# Patient Record
Sex: FEMALE | Race: WHITE | NOT HISPANIC OR LATINO | Employment: OTHER | URBAN - NONMETROPOLITAN AREA
[De-identification: names, ages, dates, MRNs, and addresses within clinical notes are randomized per-mention and may not be internally consistent; named-entity substitution may affect disease eponyms.]

---

## 2024-08-02 ENCOUNTER — ANESTHESIA (OUTPATIENT)
Dept: PERIOP | Facility: HOSPITAL | Age: 79
End: 2024-08-02
Payer: MEDICARE

## 2024-08-02 ENCOUNTER — APPOINTMENT (OUTPATIENT)
Dept: CT IMAGING | Facility: HOSPITAL | Age: 79
DRG: 398 | End: 2024-08-02
Payer: MEDICARE

## 2024-08-02 ENCOUNTER — ANESTHESIA EVENT (OUTPATIENT)
Dept: PERIOP | Facility: HOSPITAL | Age: 79
End: 2024-08-02
Payer: MEDICARE

## 2024-08-02 ENCOUNTER — HOSPITAL ENCOUNTER (INPATIENT)
Facility: HOSPITAL | Age: 79
LOS: 5 days | Discharge: HOME | DRG: 398 | End: 2024-08-09
Attending: SURGERY | Admitting: SURGERY
Payer: MEDICARE

## 2024-08-02 DIAGNOSIS — Z90.49 STATUS POST LAPAROSCOPIC APPENDECTOMY: Primary | ICD-10-CM

## 2024-08-02 DIAGNOSIS — K35.30 ACUTE APPENDICITIS WITH LOCALIZED PERITONITIS, WITHOUT PERFORATION, ABSCESS, OR GANGRENE: ICD-10-CM

## 2024-08-02 DIAGNOSIS — Z74.09 IMPAIRED MOBILITY: ICD-10-CM

## 2024-08-02 DIAGNOSIS — R13.10 DYSPHAGIA, UNSPECIFIED TYPE: ICD-10-CM

## 2024-08-02 DIAGNOSIS — K35.80 APPENDICITIS, ACUTE: ICD-10-CM

## 2024-08-02 DIAGNOSIS — K35.80 ACUTE APPENDICITIS, UNSPECIFIED ACUTE APPENDICITIS TYPE: ICD-10-CM

## 2024-08-02 LAB
ALBUMIN SERPL-MCNC: 3.5 G/DL (ref 3.5–5.2)
ALBUMIN/GLOB SERPL: 1 G/DL
ALP SERPL-CCNC: 104 U/L (ref 39–117)
ALT SERPL W P-5'-P-CCNC: 15 U/L (ref 1–33)
ANION GAP SERPL CALCULATED.3IONS-SCNC: 14 MMOL/L (ref 5–15)
AST SERPL-CCNC: 19 U/L (ref 1–32)
BACTERIA UR QL AUTO: ABNORMAL /HPF
BASOPHILS # BLD AUTO: 0.04 10*3/MM3 (ref 0–0.2)
BASOPHILS NFR BLD AUTO: 0.2 % (ref 0–1.5)
BILIRUB SERPL-MCNC: 0.8 MG/DL (ref 0–1.2)
BILIRUB UR QL STRIP: NEGATIVE
BUN SERPL-MCNC: 16 MG/DL (ref 8–23)
BUN/CREAT SERPL: 16.5 (ref 7–25)
CALCIUM SPEC-SCNC: 9 MG/DL (ref 8.6–10.5)
CHLORIDE SERPL-SCNC: 96 MMOL/L (ref 98–107)
CLARITY UR: CLEAR
CO2 SERPL-SCNC: 26 MMOL/L (ref 22–29)
COLOR UR: ABNORMAL
CREAT SERPL-MCNC: 0.97 MG/DL (ref 0.57–1)
D-LACTATE SERPL-SCNC: 1.4 MMOL/L (ref 0.5–2)
DEPRECATED RDW RBC AUTO: 43.1 FL (ref 37–54)
EGFRCR SERPLBLD CKD-EPI 2021: 60.3 ML/MIN/1.73
EOSINOPHIL # BLD AUTO: 0.05 10*3/MM3 (ref 0–0.4)
EOSINOPHIL NFR BLD AUTO: 0.2 % (ref 0.3–6.2)
ERYTHROCYTE [DISTWIDTH] IN BLOOD BY AUTOMATED COUNT: 12.6 % (ref 12.3–15.4)
GLOBULIN UR ELPH-MCNC: 3.5 GM/DL
GLUCOSE SERPL-MCNC: 184 MG/DL (ref 65–99)
GLUCOSE UR STRIP-MCNC: NEGATIVE MG/DL
HCT VFR BLD AUTO: 37.1 % (ref 34–46.6)
HGB BLD-MCNC: 11.7 G/DL (ref 12–15.9)
HGB UR QL STRIP.AUTO: NEGATIVE
HYALINE CASTS UR QL AUTO: ABNORMAL /LPF
IMM GRANULOCYTES # BLD AUTO: 0.18 10*3/MM3 (ref 0–0.05)
IMM GRANULOCYTES NFR BLD AUTO: 0.8 % (ref 0–0.5)
KETONES UR QL STRIP: NEGATIVE
LEUKOCYTE ESTERASE UR QL STRIP.AUTO: ABNORMAL
LIPASE SERPL-CCNC: 13 U/L (ref 13–60)
LYMPHOCYTES # BLD AUTO: 1 10*3/MM3 (ref 0.7–3.1)
LYMPHOCYTES NFR BLD AUTO: 4.6 % (ref 19.6–45.3)
MCH RBC QN AUTO: 29 PG (ref 26.6–33)
MCHC RBC AUTO-ENTMCNC: 31.5 G/DL (ref 31.5–35.7)
MCV RBC AUTO: 92.1 FL (ref 79–97)
MONOCYTES # BLD AUTO: 1.58 10*3/MM3 (ref 0.1–0.9)
MONOCYTES NFR BLD AUTO: 7.3 % (ref 5–12)
NEUTROPHILS NFR BLD AUTO: 18.71 10*3/MM3 (ref 1.7–7)
NEUTROPHILS NFR BLD AUTO: 86.9 % (ref 42.7–76)
NITRITE UR QL STRIP: NEGATIVE
NRBC BLD AUTO-RTO: 0 /100 WBC (ref 0–0.2)
PH UR STRIP.AUTO: 5.5 [PH] (ref 5–8)
PLATELET # BLD AUTO: 187 10*3/MM3 (ref 140–450)
PMV BLD AUTO: 12.1 FL (ref 6–12)
POTASSIUM SERPL-SCNC: 3.4 MMOL/L (ref 3.5–5.2)
PROT SERPL-MCNC: 7 G/DL (ref 6–8.5)
PROT UR QL STRIP: ABNORMAL
RBC # BLD AUTO: 4.03 10*6/MM3 (ref 3.77–5.28)
RBC # UR STRIP: ABNORMAL /HPF
REF LAB TEST METHOD: ABNORMAL
SODIUM SERPL-SCNC: 136 MMOL/L (ref 136–145)
SP GR UR STRIP: >1.03 (ref 1–1.03)
SQUAMOUS #/AREA URNS HPF: ABNORMAL /HPF
UROBILINOGEN UR QL STRIP: ABNORMAL
WBC # UR STRIP: ABNORMAL /HPF
WBC NRBC COR # BLD AUTO: 21.56 10*3/MM3 (ref 3.4–10.8)

## 2024-08-02 PROCEDURE — 99285 EMERGENCY DEPT VISIT HI MDM: CPT

## 2024-08-02 PROCEDURE — 83690 ASSAY OF LIPASE: CPT | Performed by: NURSE PRACTITIONER

## 2024-08-02 PROCEDURE — 25810000003 SODIUM CHLORIDE 0.9 % SOLUTION: Performed by: NURSE PRACTITIONER

## 2024-08-02 PROCEDURE — 25010000002 HYDROMORPHONE PER 4 MG: Performed by: NURSE PRACTITIONER

## 2024-08-02 PROCEDURE — 25010000002 ONDANSETRON PER 1 MG: Performed by: NURSE ANESTHETIST, CERTIFIED REGISTERED

## 2024-08-02 PROCEDURE — 74177 CT ABD & PELVIS W/CONTRAST: CPT

## 2024-08-02 PROCEDURE — 25010000002 BUPIVACAINE (PF) 0.5 % SOLUTION 10 ML VIAL: Performed by: SURGERY

## 2024-08-02 PROCEDURE — 25010000002 DEXAMETHASONE PER 1 MG: Performed by: NURSE ANESTHETIST, CERTIFIED REGISTERED

## 2024-08-02 PROCEDURE — 0DTJ4ZZ RESECTION OF APPENDIX, PERCUTANEOUS ENDOSCOPIC APPROACH: ICD-10-PCS | Performed by: SURGERY

## 2024-08-02 PROCEDURE — 44970 LAPAROSCOPY APPENDECTOMY: CPT | Performed by: SURGERY

## 2024-08-02 PROCEDURE — 80053 COMPREHEN METABOLIC PANEL: CPT | Performed by: NURSE PRACTITIONER

## 2024-08-02 PROCEDURE — 25510000001 IOPAMIDOL 61 % SOLUTION: Performed by: NURSE PRACTITIONER

## 2024-08-02 PROCEDURE — 25810000003 LACTATED RINGERS PER 1000 ML: Performed by: SURGERY

## 2024-08-02 PROCEDURE — 88304 TISSUE EXAM BY PATHOLOGIST: CPT | Performed by: SURGERY

## 2024-08-02 PROCEDURE — 25010000002 ONDANSETRON PER 1 MG: Performed by: NURSE PRACTITIONER

## 2024-08-02 PROCEDURE — 25010000002 KETOROLAC TROMETHAMINE PER 15 MG: Performed by: NURSE ANESTHETIST, CERTIFIED REGISTERED

## 2024-08-02 PROCEDURE — 25010000002 PROPOFOL 10 MG/ML EMULSION: Performed by: NURSE ANESTHETIST, CERTIFIED REGISTERED

## 2024-08-02 PROCEDURE — 25010000002 MORPHINE SULFATE (PF) 2 MG/ML SOLUTION: Performed by: NURSE PRACTITIONER

## 2024-08-02 PROCEDURE — G0378 HOSPITAL OBSERVATION PER HR: HCPCS

## 2024-08-02 PROCEDURE — 83605 ASSAY OF LACTIC ACID: CPT | Performed by: NURSE PRACTITIONER

## 2024-08-02 PROCEDURE — 25010000002 HYDROMORPHONE 1 MG/ML SOLUTION: Performed by: NURSE ANESTHETIST, CERTIFIED REGISTERED

## 2024-08-02 PROCEDURE — 25010000002 VASOPRESSIN 20 UNIT/ML SOLUTION: Performed by: NURSE ANESTHETIST, CERTIFIED REGISTERED

## 2024-08-02 PROCEDURE — 25010000002 FENTANYL CITRATE (PF) 100 MCG/2ML SOLUTION: Performed by: NURSE ANESTHETIST, CERTIFIED REGISTERED

## 2024-08-02 PROCEDURE — 25010000002 THIAMINE HCL 200 MG/2ML SOLUTION: Performed by: SURGERY

## 2024-08-02 PROCEDURE — 25010000002 CEFAZOLIN PER 500 MG: Performed by: SURGERY

## 2024-08-02 PROCEDURE — 81001 URINALYSIS AUTO W/SCOPE: CPT | Performed by: NURSE PRACTITIONER

## 2024-08-02 PROCEDURE — 25010000002 LIDOCAINE 1 % SOLUTION 20 ML VIAL: Performed by: SURGERY

## 2024-08-02 PROCEDURE — 25010000002 CEFAZOLIN PER 500 MG: Performed by: NURSE ANESTHETIST, CERTIFIED REGISTERED

## 2024-08-02 PROCEDURE — 85025 COMPLETE CBC W/AUTO DIFF WBC: CPT | Performed by: NURSE PRACTITIONER

## 2024-08-02 PROCEDURE — 25010000002 GLYCOPYRROLATE 0.4 MG/2ML SOLUTION: Performed by: NURSE ANESTHETIST, CERTIFIED REGISTERED

## 2024-08-02 PROCEDURE — 25810000003 LACTATED RINGERS PER 1000 ML: Performed by: NURSE ANESTHETIST, CERTIFIED REGISTERED

## 2024-08-02 DEVICE — LIGAMAX 5 MM ENDOSCOPIC MULTIPLE CLIP APPLIER
Type: IMPLANTABLE DEVICE | Site: ABDOMEN | Status: FUNCTIONAL
Brand: LIGAMAX

## 2024-08-02 DEVICE — ENDOPATH ECHELON ENDOSCOPIC LINEAR CUTTER RELOADS, WHITE, 60MM
Type: IMPLANTABLE DEVICE | Site: ABDOMEN | Status: FUNCTIONAL
Brand: ECHELON ENDOPATH

## 2024-08-02 DEVICE — ENDOPATH ECHELON ENDOSCOPIC LINEAR CUTTER RELOADS, BLUE, 60MM
Type: IMPLANTABLE DEVICE | Site: ABDOMEN | Status: FUNCTIONAL
Brand: ECHELON ENDOPATH

## 2024-08-02 RX ORDER — HYDROCODONE BITARTRATE AND ACETAMINOPHEN 5; 325 MG/1; MG/1
1 TABLET ORAL EVERY 6 HOURS PRN
Status: DISCONTINUED | OUTPATIENT
Start: 2024-08-02 | End: 2024-08-09 | Stop reason: HOSPADM

## 2024-08-02 RX ORDER — SODIUM CHLORIDE 0.9 % (FLUSH) 0.9 %
1-10 SYRINGE (ML) INJECTION AS NEEDED
Status: DISCONTINUED | OUTPATIENT
Start: 2024-08-02 | End: 2024-08-09 | Stop reason: HOSPADM

## 2024-08-02 RX ORDER — HYDROCODONE BITARTRATE AND ACETAMINOPHEN 5; 325 MG/1; MG/1
1 TABLET ORAL EVERY 4 HOURS PRN
Status: DISCONTINUED | OUTPATIENT
Start: 2024-08-02 | End: 2024-08-02 | Stop reason: HOSPADM

## 2024-08-02 RX ORDER — DROPERIDOL 2.5 MG/ML
0.62 INJECTION, SOLUTION INTRAMUSCULAR; INTRAVENOUS ONCE AS NEEDED
Status: DISCONTINUED | OUTPATIENT
Start: 2024-08-02 | End: 2024-08-02 | Stop reason: HOSPADM

## 2024-08-02 RX ORDER — CEFAZOLIN SODIUM 1 G/3ML
INJECTION, POWDER, FOR SOLUTION INTRAMUSCULAR; INTRAVENOUS AS NEEDED
Status: DISCONTINUED | OUTPATIENT
Start: 2024-08-02 | End: 2024-08-02 | Stop reason: SURG

## 2024-08-02 RX ORDER — ONDANSETRON 2 MG/ML
4 INJECTION INTRAMUSCULAR; INTRAVENOUS ONCE AS NEEDED
Status: COMPLETED | OUTPATIENT
Start: 2024-08-02 | End: 2024-08-02

## 2024-08-02 RX ORDER — IBUPROFEN 600 MG/1
600 TABLET ORAL EVERY 6 HOURS PRN
Status: DISCONTINUED | OUTPATIENT
Start: 2024-08-02 | End: 2024-08-02 | Stop reason: HOSPADM

## 2024-08-02 RX ORDER — KETOROLAC TROMETHAMINE 30 MG/ML
30 INJECTION, SOLUTION INTRAMUSCULAR; INTRAVENOUS EVERY 8 HOURS PRN
Status: ACTIVE | OUTPATIENT
Start: 2024-08-02 | End: 2024-08-03

## 2024-08-02 RX ORDER — LABETALOL HYDROCHLORIDE 5 MG/ML
5 INJECTION, SOLUTION INTRAVENOUS
Status: DISCONTINUED | OUTPATIENT
Start: 2024-08-02 | End: 2024-08-02 | Stop reason: HOSPADM

## 2024-08-02 RX ORDER — NALOXONE HCL 0.4 MG/ML
0.4 VIAL (ML) INJECTION AS NEEDED
Status: DISCONTINUED | OUTPATIENT
Start: 2024-08-02 | End: 2024-08-02 | Stop reason: HOSPADM

## 2024-08-02 RX ORDER — PANTOPRAZOLE SODIUM 40 MG/1
40 TABLET, DELAYED RELEASE ORAL
Status: DISCONTINUED | OUTPATIENT
Start: 2024-08-03 | End: 2024-08-09 | Stop reason: HOSPADM

## 2024-08-02 RX ORDER — ONDANSETRON 4 MG/1
4 TABLET, ORALLY DISINTEGRATING ORAL EVERY 6 HOURS PRN
Status: DISCONTINUED | OUTPATIENT
Start: 2024-08-02 | End: 2024-08-03

## 2024-08-02 RX ORDER — GLYCOPYRROLATE 0.2 MG/ML
INJECTION INTRAMUSCULAR; INTRAVENOUS AS NEEDED
Status: DISCONTINUED | OUTPATIENT
Start: 2024-08-02 | End: 2024-08-02 | Stop reason: SURG

## 2024-08-02 RX ORDER — THIAMINE HYDROCHLORIDE 100 MG/ML
100 INJECTION, SOLUTION INTRAMUSCULAR; INTRAVENOUS ONCE
Status: COMPLETED | OUTPATIENT
Start: 2024-08-02 | End: 2024-08-02

## 2024-08-02 RX ORDER — TRAMADOL HYDROCHLORIDE 50 MG/1
50 TABLET ORAL EVERY 6 HOURS PRN
Status: DISCONTINUED | OUTPATIENT
Start: 2024-08-02 | End: 2024-08-09 | Stop reason: HOSPADM

## 2024-08-02 RX ORDER — NEOSTIGMINE METHYLSULFATE 5 MG/5 ML
SYRINGE (ML) INTRAVENOUS AS NEEDED
Status: DISCONTINUED | OUTPATIENT
Start: 2024-08-02 | End: 2024-08-02 | Stop reason: SURG

## 2024-08-02 RX ORDER — KETOROLAC TROMETHAMINE 30 MG/ML
INJECTION, SOLUTION INTRAMUSCULAR; INTRAVENOUS AS NEEDED
Status: DISCONTINUED | OUTPATIENT
Start: 2024-08-02 | End: 2024-08-02 | Stop reason: SURG

## 2024-08-02 RX ORDER — FENTANYL CITRATE 50 UG/ML
50 INJECTION, SOLUTION INTRAMUSCULAR; INTRAVENOUS
Status: DISCONTINUED | OUTPATIENT
Start: 2024-08-02 | End: 2024-08-02 | Stop reason: HOSPADM

## 2024-08-02 RX ORDER — SODIUM CHLORIDE 0.9 % (FLUSH) 0.9 %
10 SYRINGE (ML) INJECTION AS NEEDED
Status: DISCONTINUED | OUTPATIENT
Start: 2024-08-02 | End: 2024-08-02

## 2024-08-02 RX ORDER — ONDANSETRON 2 MG/ML
4 INJECTION INTRAMUSCULAR; INTRAVENOUS EVERY 6 HOURS PRN
Status: DISCONTINUED | OUTPATIENT
Start: 2024-08-02 | End: 2024-08-03 | Stop reason: SDUPTHER

## 2024-08-02 RX ORDER — METRONIDAZOLE 500 MG/100ML
500 INJECTION, SOLUTION INTRAVENOUS EVERY 8 HOURS
Status: COMPLETED | OUTPATIENT
Start: 2024-08-03 | End: 2024-08-04

## 2024-08-02 RX ORDER — SODIUM CHLORIDE 9 MG/ML
40 INJECTION, SOLUTION INTRAVENOUS AS NEEDED
Status: DISCONTINUED | OUTPATIENT
Start: 2024-08-02 | End: 2024-08-08

## 2024-08-02 RX ORDER — ONDANSETRON 2 MG/ML
INJECTION INTRAMUSCULAR; INTRAVENOUS AS NEEDED
Status: DISCONTINUED | OUTPATIENT
Start: 2024-08-02 | End: 2024-08-02 | Stop reason: SURG

## 2024-08-02 RX ORDER — ONDANSETRON 2 MG/ML
4 INJECTION INTRAMUSCULAR; INTRAVENOUS ONCE
Status: COMPLETED | OUTPATIENT
Start: 2024-08-02 | End: 2024-08-02

## 2024-08-02 RX ORDER — HYDROCODONE BITARTRATE AND ACETAMINOPHEN 10; 325 MG/1; MG/1
1 TABLET ORAL EVERY 4 HOURS PRN
Status: DISCONTINUED | OUTPATIENT
Start: 2024-08-02 | End: 2024-08-02 | Stop reason: HOSPADM

## 2024-08-02 RX ORDER — ROCURONIUM BROMIDE 10 MG/ML
INJECTION, SOLUTION INTRAVENOUS AS NEEDED
Status: DISCONTINUED | OUTPATIENT
Start: 2024-08-02 | End: 2024-08-02 | Stop reason: SURG

## 2024-08-02 RX ORDER — NALOXONE HCL 0.4 MG/ML
0.1 VIAL (ML) INJECTION
Status: DISCONTINUED | OUTPATIENT
Start: 2024-08-02 | End: 2024-08-09 | Stop reason: HOSPADM

## 2024-08-02 RX ORDER — DIPHENHYDRAMINE HYDROCHLORIDE 50 MG/ML
25 INJECTION INTRAMUSCULAR; INTRAVENOUS EVERY 6 HOURS PRN
Status: DISCONTINUED | OUTPATIENT
Start: 2024-08-02 | End: 2024-08-09 | Stop reason: HOSPADM

## 2024-08-02 RX ORDER — LIDOCAINE HYDROCHLORIDE 20 MG/ML
INJECTION, SOLUTION EPIDURAL; INFILTRATION; INTRACAUDAL; PERINEURAL AS NEEDED
Status: DISCONTINUED | OUTPATIENT
Start: 2024-08-02 | End: 2024-08-02 | Stop reason: SURG

## 2024-08-02 RX ORDER — HYDROMORPHONE HYDROCHLORIDE 1 MG/ML
0.5 INJECTION, SOLUTION INTRAMUSCULAR; INTRAVENOUS; SUBCUTANEOUS ONCE
Status: COMPLETED | OUTPATIENT
Start: 2024-08-02 | End: 2024-08-02

## 2024-08-02 RX ORDER — FENTANYL CITRATE 50 UG/ML
INJECTION, SOLUTION INTRAMUSCULAR; INTRAVENOUS AS NEEDED
Status: DISCONTINUED | OUTPATIENT
Start: 2024-08-02 | End: 2024-08-02 | Stop reason: SURG

## 2024-08-02 RX ORDER — SODIUM CHLORIDE 0.9 % (FLUSH) 0.9 %
10 SYRINGE (ML) INJECTION EVERY 12 HOURS SCHEDULED
Status: DISCONTINUED | OUTPATIENT
Start: 2024-08-03 | End: 2024-08-08

## 2024-08-02 RX ORDER — OMEPRAZOLE 40 MG/1
40 CAPSULE, DELAYED RELEASE ORAL DAILY
COMMUNITY

## 2024-08-02 RX ORDER — LOSARTAN POTASSIUM 25 MG/1
25 TABLET ORAL DAILY
Status: ON HOLD | COMMUNITY
End: 2024-08-03 | Stop reason: DRUGHIGH

## 2024-08-02 RX ORDER — SODIUM CHLORIDE, SODIUM LACTATE, POTASSIUM CHLORIDE, CALCIUM CHLORIDE 600; 310; 30; 20 MG/100ML; MG/100ML; MG/100ML; MG/100ML
INJECTION, SOLUTION INTRAVENOUS CONTINUOUS PRN
Status: DISCONTINUED | OUTPATIENT
Start: 2024-08-02 | End: 2024-08-02 | Stop reason: SURG

## 2024-08-02 RX ORDER — ACETAMINOPHEN 160 MG/5ML
325 SOLUTION ORAL EVERY 8 HOURS
Status: DISCONTINUED | OUTPATIENT
Start: 2024-08-03 | End: 2024-08-03

## 2024-08-02 RX ORDER — LOSARTAN POTASSIUM 50 MG/1
25 TABLET ORAL DAILY
Status: DISCONTINUED | OUTPATIENT
Start: 2024-08-03 | End: 2024-08-07

## 2024-08-02 RX ORDER — FLUMAZENIL 0.1 MG/ML
0.2 INJECTION INTRAVENOUS AS NEEDED
Status: DISCONTINUED | OUTPATIENT
Start: 2024-08-02 | End: 2024-08-02 | Stop reason: HOSPADM

## 2024-08-02 RX ORDER — LABETALOL HYDROCHLORIDE 5 MG/ML
10 INJECTION, SOLUTION INTRAVENOUS
Status: COMPLETED | OUTPATIENT
Start: 2024-08-02 | End: 2024-08-04

## 2024-08-02 RX ORDER — PROPOFOL 10 MG/ML
VIAL (ML) INTRAVENOUS AS NEEDED
Status: DISCONTINUED | OUTPATIENT
Start: 2024-08-02 | End: 2024-08-02 | Stop reason: SURG

## 2024-08-02 RX ORDER — MORPHINE SULFATE 2 MG/ML
2 INJECTION, SOLUTION INTRAMUSCULAR; INTRAVENOUS ONCE
Status: COMPLETED | OUTPATIENT
Start: 2024-08-02 | End: 2024-08-02

## 2024-08-02 RX ORDER — HYDROMORPHONE HYDROCHLORIDE 1 MG/ML
0.5 INJECTION, SOLUTION INTRAMUSCULAR; INTRAVENOUS; SUBCUTANEOUS
Status: DISCONTINUED | OUTPATIENT
Start: 2024-08-02 | End: 2024-08-03

## 2024-08-02 RX ORDER — DEXAMETHASONE SODIUM PHOSPHATE 4 MG/ML
INJECTION, SOLUTION INTRA-ARTICULAR; INTRALESIONAL; INTRAMUSCULAR; INTRAVENOUS; SOFT TISSUE AS NEEDED
Status: DISCONTINUED | OUTPATIENT
Start: 2024-08-02 | End: 2024-08-02 | Stop reason: SURG

## 2024-08-02 RX ORDER — SIMETHICONE 80 MG
40 TABLET,CHEWABLE ORAL 4 TIMES DAILY PRN
Status: DISCONTINUED | OUTPATIENT
Start: 2024-08-02 | End: 2024-08-09 | Stop reason: HOSPADM

## 2024-08-02 RX ORDER — SODIUM CHLORIDE, SODIUM LACTATE, POTASSIUM CHLORIDE, CALCIUM CHLORIDE 600; 310; 30; 20 MG/100ML; MG/100ML; MG/100ML; MG/100ML
75 INJECTION, SOLUTION INTRAVENOUS CONTINUOUS
Status: DISCONTINUED | OUTPATIENT
Start: 2024-08-03 | End: 2024-08-07

## 2024-08-02 RX ADMIN — ONDANSETRON 4 MG: 2 INJECTION INTRAMUSCULAR; INTRAVENOUS at 19:58

## 2024-08-02 RX ADMIN — KETOROLAC TROMETHAMINE 10 MG: 30 INJECTION, SOLUTION INTRAMUSCULAR; INTRAVENOUS at 21:58

## 2024-08-02 RX ADMIN — DEXAMETHASONE SODIUM PHOSPHATE 4 MG: 4 INJECTION, SOLUTION INTRAMUSCULAR; INTRAVENOUS at 21:58

## 2024-08-02 RX ADMIN — LIDOCAINE HYDROCHLORIDE 100 MG: 20 INJECTION, SOLUTION EPIDURAL; INFILTRATION; INTRACAUDAL; PERINEURAL at 20:20

## 2024-08-02 RX ADMIN — MORPHINE SULFATE 2 MG: 2 INJECTION, SOLUTION INTRAMUSCULAR; INTRAVENOUS at 16:26

## 2024-08-02 RX ADMIN — ONDANSETRON 4 MG: 2 INJECTION INTRAMUSCULAR; INTRAVENOUS at 23:04

## 2024-08-02 RX ADMIN — CEFAZOLIN 1000 MG: 1 INJECTION, POWDER, FOR SOLUTION INTRAMUSCULAR; INTRAVENOUS at 23:42

## 2024-08-02 RX ADMIN — FENTANYL CITRATE 50 MCG: 50 INJECTION, SOLUTION INTRAMUSCULAR; INTRAVENOUS at 20:57

## 2024-08-02 RX ADMIN — SODIUM CHLORIDE, POTASSIUM CHLORIDE, SODIUM LACTATE AND CALCIUM CHLORIDE: 600; 310; 30; 20 INJECTION, SOLUTION INTRAVENOUS at 20:15

## 2024-08-02 RX ADMIN — HYDROMORPHONE HYDROCHLORIDE 1 MG: 1 INJECTION, SOLUTION INTRAMUSCULAR; INTRAVENOUS; SUBCUTANEOUS at 21:33

## 2024-08-02 RX ADMIN — SODIUM CHLORIDE, POTASSIUM CHLORIDE, SODIUM LACTATE AND CALCIUM CHLORIDE 125 ML/HR: 600; 310; 30; 20 INJECTION, SOLUTION INTRAVENOUS at 23:42

## 2024-08-02 RX ADMIN — ONDANSETRON 4 MG: 2 INJECTION INTRAMUSCULAR; INTRAVENOUS at 21:58

## 2024-08-02 RX ADMIN — SODIUM CHLORIDE 500 ML: 9 INJECTION, SOLUTION INTRAVENOUS at 16:23

## 2024-08-02 RX ADMIN — Medication 10 ML: at 23:43

## 2024-08-02 RX ADMIN — GLYCOPYRROLATE 0.4 MG: 0.2 INJECTION INTRAMUSCULAR; INTRAVENOUS at 21:58

## 2024-08-02 RX ADMIN — THIAMINE HYDROCHLORIDE 100 MG: 100 INJECTION, SOLUTION INTRAMUSCULAR; INTRAVENOUS at 23:43

## 2024-08-02 RX ADMIN — Medication 3 MG: at 21:58

## 2024-08-02 RX ADMIN — ONDANSETRON 4 MG: 2 INJECTION INTRAMUSCULAR; INTRAVENOUS at 16:26

## 2024-08-02 RX ADMIN — ROCURONIUM BROMIDE 10 MG: 10 INJECTION, SOLUTION INTRAVENOUS at 21:15

## 2024-08-02 RX ADMIN — PROPOFOL 100 MG: 10 INJECTION, EMULSION INTRAVENOUS at 20:20

## 2024-08-02 RX ADMIN — CEFAZOLIN 2 G: 330 INJECTION, POWDER, FOR SOLUTION INTRAMUSCULAR; INTRAVENOUS at 20:33

## 2024-08-02 RX ADMIN — IOPAMIDOL 100 ML: 612 INJECTION, SOLUTION INTRAVENOUS at 18:20

## 2024-08-02 RX ADMIN — ROCURONIUM BROMIDE 35 MG: 10 INJECTION, SOLUTION INTRAVENOUS at 20:20

## 2024-08-02 RX ADMIN — FENTANYL CITRATE 50 MCG: 50 INJECTION, SOLUTION INTRAMUSCULAR; INTRAVENOUS at 20:20

## 2024-08-02 RX ADMIN — HYDROMORPHONE HYDROCHLORIDE 0.5 MG: 1 INJECTION, SOLUTION INTRAMUSCULAR; INTRAVENOUS; SUBCUTANEOUS at 19:58

## 2024-08-02 NOTE — ED PROVIDER NOTES
Subjective   History of Present Illness  Patient is a 78-year-old female who presents to the ER with chief complaints of abdominal pain.  She states she began experiencing right lower quadrant abdominal pain yesterday.  She reports nausea with vomiting however denies any diarrhea.  She denies any recorded fevers or dysuria.  She states pain is much worse with walking or particular movements and is described as sharp and stabbing with movement.  Due to symptoms described she came the ER for evaluation and treatment.  Past medical history significant for hypertension        Review of Systems   Constitutional: Negative.  Negative for fever.   HENT: Negative.  Negative for congestion.    Respiratory: Negative.  Negative for cough and shortness of breath.    Cardiovascular: Negative.  Negative for chest pain.   Gastrointestinal:  Positive for abdominal pain, nausea and vomiting. Negative for constipation and diarrhea.   Genitourinary: Negative.  Negative for dysuria.   Musculoskeletal: Negative.  Negative for back pain.   Skin: Negative.    All other systems reviewed and are negative.      Past Medical History:   Diagnosis Date    Hypertension        No Known Allergies    Past Surgical History:   Procedure Laterality Date    HYSTERECTOMY         History reviewed. No pertinent family history.    Social History     Socioeconomic History    Marital status:            Objective   Physical Exam  Vitals and nursing note reviewed.   Constitutional:       Appearance: She is well-developed. She is ill-appearing.   HENT:      Head: Normocephalic and atraumatic.      Nose: Nose normal.   Eyes:      Conjunctiva/sclera: Conjunctivae normal.      Pupils: Pupils are equal, round, and reactive to light.   Cardiovascular:      Rate and Rhythm: Normal rate and regular rhythm.      Heart sounds: Normal heart sounds.   Pulmonary:      Effort: Pulmonary effort is normal.      Breath sounds: Normal breath sounds.   Abdominal:       General: Bowel sounds are normal.      Palpations: Abdomen is soft.      Tenderness: There is abdominal tenderness in the right lower quadrant. There is guarding. There is no right CVA tenderness or rebound.   Musculoskeletal:         General: Normal range of motion.      Cervical back: Normal range of motion and neck supple.   Skin:     General: Skin is warm and dry.      Capillary Refill: Capillary refill takes less than 2 seconds.   Neurological:      Mental Status: She is alert and oriented to person, place, and time.   Psychiatric:         Behavior: Behavior normal.         Procedures           ED Course  ED Course as of 08/02/24 1936   Fri Aug 02, 2024   1903 Discussed with Dr. Cruz who will be coming to the ER to evaluate the patient.   Discussed other findings on CT scan with the patient and  including the liver lesions that would need further evaluation outpatient with their pcp. Patient and  both expressed understanding.  [TW]   1909 Dr. Cruz at bedside to evaluate the patient.  [TW]      ED Course User Index  [TW] Maria Teresa John APRN                                             Medical Decision Making  Patient is a 78-year-old female who presents to the ER with chief complaints of abdominal pain.  She states she began experiencing right lower quadrant abdominal pain yesterday.  She reports nausea with vomiting however denies any diarrhea.  She denies any recorded fevers or dysuria.  She states pain is much worse with walking or particular movements and is described as sharp and stabbing with movement.  Due to symptoms described she came the ER for evaluation and treatment.  Past medical history significant for hypertension  Differential diagnosis: Appendicitis, UTI, kidney stone, viral illness, and other    Labs Reviewed  COMPREHENSIVE METABOLIC PANEL - Abnormal; Notable for the following components:     Glucose                       184 (*)                Potassium                      3.4 (*)                Chloride                      96 (*)              All other components within normal limits         Narrative: GFR Normal >60                  Chronic Kidney Disease <60                  Kidney Failure <15                                    The GFR formula is only valid for adults with stable renal function between ages 18 and 70.  CBC WITH AUTO DIFFERENTIAL - Abnormal; Notable for the following components:     WBC                           21.56 (*)               Hemoglobin                    11.7 (*)               MPV                           12.1 (*)               Neutrophil %                  86.9 (*)               Lymphocyte %                  4.6 (*)                Eosinophil %                  0.2 (*)                Immature Grans %              0.8 (*)                Neutrophils, Absolute         18.71 (*)               Monocytes, Absolute           1.58 (*)               Immature Grans, Absolute      0.18 (*)            All other components within normal limits  LIPASE - Normal  LACTIC ACID, PLASMA - Normal  URINALYSIS W/ MICROSCOPIC IF INDICATED (NO CULTURE)  CBC AND DIFFERENTIAL     CT Abdomen Pelvis With Contrast   Final Result    1. Acute appendicitis with wall thickening and inflammatory changes in    the adjacent soft tissues. There is an appendicolith. There is some free    fluid in the right lower quadrant and pelvis related to the inflammatory    process. No perforation or abscess is visualized.    2. Fatty infiltration of the liver. There are scattered liver lesions    that are not fully evaluated on this examination. They are not simple    cyst. The differential includes hemangiomas or other lesions. Metastatic    disease cannot be excluded in a patient of this age. One of the lesions    does demonstrate a focal area of nodular enhancement along the periphery    of the lesion which can be suggestive of hemangioma. Some of the lesions    are very small. Liver mass MRI or  CT is recommended to further evaluate    these lesions unless there are prior outside studies.    3. Prior hysterectomy. Atheromatous disease of the aortoiliac vessels.    Degenerative changes of the spine.              The full report of this exam was immediately signed and available to the    emergency room. The patient is currently in the emergency room.         This report was signed and finalized on 8/2/2024 6:42 PM by Dr. Rich Austin MD.         Discussed with Dr. Cruz who will be coming to the ER to evaluate the patient.   Discussed other findings on CT scan with the patient and  including the liver lesions that would need further evaluation outpatient with their pcp. Patient and  both expressed understanding.     Plan is to take patient to the OR - See Dr. Cruz note for details      Problems Addressed:  Acute appendicitis, unspecified acute appendicitis type: acute illness or injury    Amount and/or Complexity of Data Reviewed  Labs: ordered. Decision-making details documented in ED Course.  Radiology: ordered. Decision-making details documented in ED Course.  Discussion of management or test interpretation with external provider(s): Discussed with Dr. Cruz    Risk  Prescription drug management.        Final diagnoses:   Acute appendicitis, unspecified acute appendicitis type       ED Disposition  ED Disposition       ED Disposition   Send to OR    Condition   --    Comment   --               No follow-up provider specified.       Medication List      No changes were made to your prescriptions during this visit.            Maria Teresa John, APRN  08/02/24 1936

## 2024-08-03 ENCOUNTER — APPOINTMENT (OUTPATIENT)
Dept: GENERAL RADIOLOGY | Facility: HOSPITAL | Age: 79
DRG: 398 | End: 2024-08-03
Payer: MEDICARE

## 2024-08-03 LAB
ALBUMIN SERPL-MCNC: 3 G/DL (ref 3.5–5.2)
ALBUMIN/GLOB SERPL: 0.9 G/DL
ALP SERPL-CCNC: 96 U/L (ref 39–117)
ALT SERPL W P-5'-P-CCNC: 11 U/L (ref 1–33)
ANION GAP SERPL CALCULATED.3IONS-SCNC: 10 MMOL/L (ref 5–15)
AST SERPL-CCNC: 16 U/L (ref 1–32)
BASOPHILS # BLD AUTO: 0.01 10*3/MM3 (ref 0–0.2)
BASOPHILS NFR BLD AUTO: 0.1 % (ref 0–1.5)
BILIRUB SERPL-MCNC: 0.6 MG/DL (ref 0–1.2)
BUN SERPL-MCNC: 19 MG/DL (ref 8–23)
BUN/CREAT SERPL: 17.6 (ref 7–25)
CALCIUM SPEC-SCNC: 8.3 MG/DL (ref 8.6–10.5)
CHLORIDE SERPL-SCNC: 101 MMOL/L (ref 98–107)
CO2 SERPL-SCNC: 28 MMOL/L (ref 22–29)
CREAT SERPL-MCNC: 1.08 MG/DL (ref 0.57–1)
DEPRECATED RDW RBC AUTO: 44.8 FL (ref 37–54)
EGFRCR SERPLBLD CKD-EPI 2021: 53 ML/MIN/1.73
EOSINOPHIL # BLD AUTO: 0 10*3/MM3 (ref 0–0.4)
EOSINOPHIL NFR BLD AUTO: 0 % (ref 0.3–6.2)
ERYTHROCYTE [DISTWIDTH] IN BLOOD BY AUTOMATED COUNT: 13.1 % (ref 12.3–15.4)
GLOBULIN UR ELPH-MCNC: 3.4 GM/DL
GLUCOSE SERPL-MCNC: 136 MG/DL (ref 65–99)
HCT VFR BLD AUTO: 32.6 % (ref 34–46.6)
HGB BLD-MCNC: 10 G/DL (ref 12–15.9)
IMM GRANULOCYTES # BLD AUTO: 0.06 10*3/MM3 (ref 0–0.05)
IMM GRANULOCYTES NFR BLD AUTO: 0.5 % (ref 0–0.5)
LYMPHOCYTES # BLD AUTO: 1.36 10*3/MM3 (ref 0.7–3.1)
LYMPHOCYTES NFR BLD AUTO: 10.3 % (ref 19.6–45.3)
MCH RBC QN AUTO: 29.2 PG (ref 26.6–33)
MCHC RBC AUTO-ENTMCNC: 30.7 G/DL (ref 31.5–35.7)
MCV RBC AUTO: 95.3 FL (ref 79–97)
MONOCYTES # BLD AUTO: 0.93 10*3/MM3 (ref 0.1–0.9)
MONOCYTES NFR BLD AUTO: 7 % (ref 5–12)
NEUTROPHILS NFR BLD AUTO: 10.85 10*3/MM3 (ref 1.7–7)
NEUTROPHILS NFR BLD AUTO: 82.1 % (ref 42.7–76)
NRBC BLD AUTO-RTO: 0 /100 WBC (ref 0–0.2)
PLATELET # BLD AUTO: 159 10*3/MM3 (ref 140–450)
PMV BLD AUTO: 12.6 FL (ref 6–12)
POTASSIUM SERPL-SCNC: 3.8 MMOL/L (ref 3.5–5.2)
PROT SERPL-MCNC: 6.4 G/DL (ref 6–8.5)
RBC # BLD AUTO: 3.42 10*6/MM3 (ref 3.77–5.28)
SODIUM SERPL-SCNC: 139 MMOL/L (ref 136–145)
WBC NRBC COR # BLD AUTO: 13.21 10*3/MM3 (ref 3.4–10.8)

## 2024-08-03 PROCEDURE — 25010000002 METOCLOPRAMIDE PER 10 MG: Performed by: SURGERY

## 2024-08-03 PROCEDURE — 80053 COMPREHEN METABOLIC PANEL: CPT | Performed by: SURGERY

## 2024-08-03 PROCEDURE — 25010000002 CEFAZOLIN PER 500 MG: Performed by: SURGERY

## 2024-08-03 PROCEDURE — 25010000002 METRONIDAZOLE 500 MG/100ML SOLUTION: Performed by: SURGERY

## 2024-08-03 PROCEDURE — 25010000002 PROMETHAZINE PER 50 MG: Performed by: SURGERY

## 2024-08-03 PROCEDURE — 72170 X-RAY EXAM OF PELVIS: CPT

## 2024-08-03 PROCEDURE — 85025 COMPLETE CBC W/AUTO DIFF WBC: CPT | Performed by: SURGERY

## 2024-08-03 PROCEDURE — 25010000002 DEXAMETHASONE PER 1 MG: Performed by: SURGERY

## 2024-08-03 PROCEDURE — 94799 UNLISTED PULMONARY SVC/PX: CPT

## 2024-08-03 PROCEDURE — 25810000003 SODIUM CHLORIDE 0.9 % SOLUTION: Performed by: SURGERY

## 2024-08-03 PROCEDURE — 25010000002 HYDROMORPHONE PER 4 MG: Performed by: SURGERY

## 2024-08-03 PROCEDURE — 25010000002 ONDANSETRON PER 1 MG: Performed by: SURGERY

## 2024-08-03 PROCEDURE — G0378 HOSPITAL OBSERVATION PER HR: HCPCS

## 2024-08-03 PROCEDURE — 25810000003 LACTATED RINGERS PER 1000 ML: Performed by: SURGERY

## 2024-08-03 RX ORDER — METOCLOPRAMIDE HYDROCHLORIDE 5 MG/ML
5 INJECTION INTRAMUSCULAR; INTRAVENOUS EVERY 8 HOURS
Status: DISCONTINUED | OUTPATIENT
Start: 2024-08-03 | End: 2024-08-09 | Stop reason: HOSPADM

## 2024-08-03 RX ORDER — ONDANSETRON 2 MG/ML
4 INJECTION INTRAMUSCULAR; INTRAVENOUS EVERY 6 HOURS PRN
Status: DISCONTINUED | OUTPATIENT
Start: 2024-08-03 | End: 2024-08-09 | Stop reason: HOSPADM

## 2024-08-03 RX ORDER — ACETAMINOPHEN 325 MG/1
650 TABLET ORAL EVERY 6 HOURS
Status: DISCONTINUED | OUTPATIENT
Start: 2024-08-03 | End: 2024-08-09 | Stop reason: HOSPADM

## 2024-08-03 RX ORDER — LOSARTAN POTASSIUM AND HYDROCHLOROTHIAZIDE 25; 100 MG/1; MG/1
1 TABLET ORAL DAILY
COMMUNITY
End: 2024-08-09 | Stop reason: HOSPADM

## 2024-08-03 RX ORDER — DEXAMETHASONE SODIUM PHOSPHATE 4 MG/ML
4 INJECTION, SOLUTION INTRA-ARTICULAR; INTRALESIONAL; INTRAMUSCULAR; INTRAVENOUS; SOFT TISSUE EVERY 8 HOURS PRN
Status: DISCONTINUED | OUTPATIENT
Start: 2024-08-03 | End: 2024-08-08

## 2024-08-03 RX ADMIN — SODIUM CHLORIDE, POTASSIUM CHLORIDE, SODIUM LACTATE AND CALCIUM CHLORIDE 125 ML/HR: 600; 310; 30; 20 INJECTION, SOLUTION INTRAVENOUS at 16:55

## 2024-08-03 RX ADMIN — METRONIDAZOLE 500 MG: 500 INJECTION, SOLUTION INTRAVENOUS at 00:48

## 2024-08-03 RX ADMIN — METRONIDAZOLE 500 MG: 500 INJECTION, SOLUTION INTRAVENOUS at 16:56

## 2024-08-03 RX ADMIN — DEXAMETHASONE SODIUM PHOSPHATE 4 MG: 4 INJECTION, SOLUTION INTRA-ARTICULAR; INTRALESIONAL; INTRAMUSCULAR; INTRAVENOUS; SOFT TISSUE at 21:38

## 2024-08-03 RX ADMIN — SODIUM CHLORIDE, POTASSIUM CHLORIDE, SODIUM LACTATE AND CALCIUM CHLORIDE 125 ML/HR: 600; 310; 30; 20 INJECTION, SOLUTION INTRAVENOUS at 07:38

## 2024-08-03 RX ADMIN — METOCLOPRAMIDE HYDROCHLORIDE 5 MG: 5 INJECTION INTRAMUSCULAR; INTRAVENOUS at 21:11

## 2024-08-03 RX ADMIN — PROMETHAZINE HYDROCHLORIDE 12.5 MG: 25 INJECTION INTRAMUSCULAR; INTRAVENOUS at 23:02

## 2024-08-03 RX ADMIN — HYDROMORPHONE HYDROCHLORIDE 0.5 MG: 1 INJECTION, SOLUTION INTRAMUSCULAR; INTRAVENOUS; SUBCUTANEOUS at 05:49

## 2024-08-03 RX ADMIN — PANTOPRAZOLE SODIUM 40 MG: 40 TABLET, DELAYED RELEASE ORAL at 05:41

## 2024-08-03 RX ADMIN — ONDANSETRON 4 MG: 2 INJECTION INTRAMUSCULAR; INTRAVENOUS at 13:14

## 2024-08-03 RX ADMIN — ACETAMINOPHEN 650 MG: 325 TABLET ORAL at 13:14

## 2024-08-03 RX ADMIN — METOCLOPRAMIDE HYDROCHLORIDE 5 MG: 5 INJECTION INTRAMUSCULAR; INTRAVENOUS at 13:14

## 2024-08-03 RX ADMIN — METRONIDAZOLE 500 MG: 500 INJECTION, SOLUTION INTRAVENOUS at 09:52

## 2024-08-03 RX ADMIN — CEFAZOLIN 1000 MG: 1 INJECTION, POWDER, FOR SOLUTION INTRAMUSCULAR; INTRAVENOUS at 09:52

## 2024-08-03 RX ADMIN — SODIUM CHLORIDE 500 ML: 9 INJECTION, SOLUTION INTRAVENOUS at 13:14

## 2024-08-03 RX ADMIN — ONDANSETRON 4 MG: 2 INJECTION INTRAMUSCULAR; INTRAVENOUS at 01:49

## 2024-08-03 RX ADMIN — METOCLOPRAMIDE HYDROCHLORIDE 5 MG: 5 INJECTION INTRAMUSCULAR; INTRAVENOUS at 04:12

## 2024-08-03 RX ADMIN — Medication 10 ML: at 21:11

## 2024-08-03 NOTE — H&P
General Surgery   History and Physical     Referring Provider: No ref. provider found    Patient Care Team:  Jena Shen MD as PCP - General (Family Medicine)    Chief complaint: Right lower quadrant pain    Subjective      History of present illness:  The patient is a 78 y.o. female with complaints of right lower quadrant pain associated with nausea and vomiting.   Rachel Suiter yesterday the symptoms presented and did not resolve.  She stated she tried today and suffered nausea and vomiting symptoms.  Due to the symptoms not improving and progressing she visited our emergency department.    Review of Systems    Review of Systems - General ROS: negative  ENT ROS: negative  Respiratory ROS: no cough, shortness of breath, or wheezing  Cardiovascular ROS: no chest pain or dyspnea on exertion  Gastrointestinal ROS: positive for - abdominal pain and nausea/vomiting    History  Past Medical History:   Diagnosis Date    Hypertension    ,   Past Surgical History:   Procedure Laterality Date    HYSTERECTOMY     , History reviewed. No pertinent family history.,  , (Not in a hospital admission)   and Allergies:  Patient has no known allergies.    Current Facility-Administered Medications:     HYDROmorphone (DILAUDID) injection 0.5 mg, 0.5 mg, Intravenous, Once, Maria Teresa John, CAMPBELL    ondansetron (ZOFRAN) injection 4 mg, 4 mg, Intravenous, Once, Maria Teresa John APRN    [COMPLETED] Insert Peripheral IV, , , Once **AND** sodium chloride 0.9 % flush 10 mL, 10 mL, Intravenous, PRN, Maria Teresa John, CAMPBELL    Current Outpatient Medications:     losartan (COZAAR) 25 MG tablet, Take 1 tablet by mouth Daily., Disp: , Rfl:     omeprazole (priLOSEC) 40 MG capsule, Take 1 capsule by mouth Daily., Disp: , Rfl:     Objective     Vital Signs   Temp:  [98.6 °F (37 °C)] 98.6 °F (37 °C)  Heart Rate:  [81-96] 81  Resp:  [16-18] 18  BP: (124-133)/(68-73) 133/68    Physical Exam:  Physical Exam  Constitutional:       General:  She is not in acute distress.     Appearance: She is normal weight. She is not ill-appearing.   HENT:      Head: Normocephalic.   Cardiovascular:      Rate and Rhythm: Normal rate and regular rhythm.   Pulmonary:      Effort: Pulmonary effort is normal. No respiratory distress.      Breath sounds: Normal breath sounds.   Abdominal:      General: Abdomen is flat. Bowel sounds are normal. There is no distension.      Palpations: Abdomen is soft.      Tenderness: There is abdominal tenderness in the right lower quadrant. There is no guarding or rebound.          Results Review:     Lab Results (last 24 hours)       Procedure Component Value Units Date/Time    Urinalysis With Microscopic If Indicated (No Culture) - Urine, Clean Catch [[id number]]  (Abnormal) Collected: 08/02/24 1852    Specimen: Urine, Clean Catch Updated: 08/02/24 1928     Color, UA Dark Yellow     Appearance, UA Clear     pH, UA 5.5     Specific Gravity, UA >1.030     Glucose, UA Negative     Ketones, UA Negative     Bilirubin, UA Negative     Blood, UA Negative     Protein, UA 30 mg/dL (1+)     Leuk Esterase, UA Trace     Nitrite, UA Negative     Urobilinogen, UA 0.2 E.U./dL    Urinalysis, Microscopic Only - Urine, Clean Catch [[id number]]  (Abnormal) Collected: 08/02/24 1852    Specimen: Urine, Clean Catch Updated: 08/02/24 1928     RBC, UA 0-2 /HPF      WBC, UA 3-5 /HPF      Bacteria, UA 1+ /HPF      Squamous Epithelial Cells, UA 7-12 /HPF      Hyaline Casts, UA None Seen /LPF      Methodology Manual Light Microscopy    Comprehensive Metabolic Panel [[id number]]  (Abnormal) Collected: 08/02/24 1625    Specimen: Blood Updated: 08/02/24 1654     Glucose 184 mg/dL      BUN 16 mg/dL      Creatinine 0.97 mg/dL      Sodium 136 mmol/L      Potassium 3.4 mmol/L      Chloride 96 mmol/L      CO2 26.0 mmol/L      Calcium 9.0 mg/dL      Total Protein 7.0 g/dL      Albumin 3.5 g/dL      ALT (SGPT) 15 U/L      AST (SGOT) 19 U/L      Alkaline Phosphatase 104 U/L       Total Bilirubin 0.8 mg/dL      Globulin 3.5 gm/dL      A/G Ratio 1.0 g/dL      BUN/Creatinine Ratio 16.5     Anion Gap 14.0 mmol/L      eGFR 60.3 mL/min/1.73     Narrative:      GFR Normal >60  Chronic Kidney Disease <60  Kidney Failure <15    The GFR formula is only valid for adults with stable renal function between ages 18 and 70.    Lactic Acid, Plasma [[id number]]  (Normal) Collected: 08/02/24 1625    Specimen: Blood Updated: 08/02/24 1652     Lactate 1.4 mmol/L     Lipase [[id number]]  (Normal) Collected: 08/02/24 1625    Specimen: Blood Updated: 08/02/24 1649     Lipase 13 U/L     CBC & Differential [[id number]]  (Abnormal) Collected: 08/02/24 1625    Specimen: Blood Updated: 08/02/24 1636    Narrative:      The following orders were created for panel order CBC & Differential.  Procedure                               Abnormality         Status                     ---------                               -----------         ------                     CBC Auto Differential[[id number]]        Abnormal            Final result                 Please view results for these tests on the individual orders.    CBC Auto Differential [[id number]]  (Abnormal) Collected: 08/02/24 1625    Specimen: Blood Updated: 08/02/24 1636     WBC 21.56 10*3/mm3      RBC 4.03 10*6/mm3      Hemoglobin 11.7 g/dL      Hematocrit 37.1 %      MCV 92.1 fL      MCH 29.0 pg      MCHC 31.5 g/dL      RDW 12.6 %      RDW-SD 43.1 fl      MPV 12.1 fL      Platelets 187 10*3/mm3      Neutrophil % 86.9 %      Lymphocyte % 4.6 %      Monocyte % 7.3 %      Eosinophil % 0.2 %      Basophil % 0.2 %      Immature Grans % 0.8 %      Neutrophils, Absolute 18.71 10*3/mm3      Lymphocytes, Absolute 1.00 10*3/mm3      Monocytes, Absolute 1.58 10*3/mm3      Eosinophils, Absolute 0.05 10*3/mm3      Basophils, Absolute 0.04 10*3/mm3      Immature Grans, Absolute 0.18 10*3/mm3      nRBC 0.0 /100 WBC           Imaging Results (Last 24 Hours)       Procedure Component  Value Units Date/Time    CT Abdomen Pelvis With Contrast [[id number]] Collected: 08/02/24 1834     Updated: 08/02/24 1845    Narrative:      EXAMINATION:  CT ABDOMEN PELVIS W CONTRAST-  8/2/2024 5:14 PM     HISTORY: Right lower quadrant abdominal pain.     TECHNIQUE: Spiral CT was performed of the abdomen and pelvis with IV  contrast. Multiplanar images were reconstructed.     DLP: 390.58 mGy.cm Automated dosage reduction technique was utilized to  reduce patient dose.     COMPARISON: No comparison study.      LUNG BASES: There is atelectasis in both lung bases. There is  cardiomegaly.     LIVER AND SPLEEN: There is fatty infiltration of the liver. There is a  1.6 cm low-density liver lesion in the right lobe image 25 series 2.  There are two 5 mm low-density lesions in the right hepatic lobe  laterally image 23 series 2. There is an additional 1.5 cm lesion in the  right hepatic lobe medially on the same image with suggestion of some  nodular enhancement along the wall. There is an additional 4 mm  low-density lesion in the right lobe laterally image 26 series 2. The  spleen is unremarkable. There are no dense gallstones.     PANCREAS: No pancreatic mass or inflammatory change.     KIDNEYS AND ADRENALS: The adrenal glands and left kidney are  unremarkable. There are 8 mm and 6 mm indeterminate small renal lesions  in the lateral right kidney on image 41 series 2. The ureters are  nondilated. There is thickening of the wall of the bladder. The bladder  is not well distended.     BOWEL: No oral contrast was given. Gastric wall thickening likely due to  under distention. Small hiatal hernia. Small bowel loops are nondilated.  There is thickening of the wall of the appendix. The appendix is dilated  measuring 12 mm. There is an appendicolith in the appendix. There is  extensive stranding of the fat adjacent to the appendix. There is a  small amount of free fluid in this area and in the pelvis. No abscess is  visualized.  There is mild wall thickening of the adjacent colon wall  likely secondarily inflamed. There is under distention of portions of  the colon.     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 Prior  hysterectomy. There are degenerative changes of the spine. No acute bony  abnormality is seen.          Impression:      1. Acute appendicitis with wall thickening and inflammatory changes in  the adjacent soft tissues. There is an appendicolith. There is some free  fluid in the right lower quadrant and pelvis related to the inflammatory  process. No perforation or abscess is visualized.  2. Fatty infiltration of the liver. There are scattered liver lesions  that are not fully evaluated on this examination. They are not simple  cyst. The differential includes hemangiomas or other lesions. Metastatic  disease cannot be excluded in a patient of this age. One of the lesions  does demonstrate a focal area of nodular enhancement along the periphery  of the lesion which can be suggestive of hemangioma. Some of the lesions  are very small. Liver mass MRI or CT is recommended to further evaluate  these lesions unless there are prior outside studies.  3. Prior hysterectomy. Atheromatous disease of the aortoiliac vessels.  Degenerative changes of the spine.        The full report of this exam was immediately signed and available to the  emergency room. The patient is currently in the emergency room.     This report was signed and finalized on 8/2/2024 6:42 PM by Dr. Rich Austin MD.               ASSESSMENT/PLAN   Diagnosis Plan   1. Acute appendicitis with localized peritonitis, without perforation, abscess, or gangrene  Case Request    Case Request      2. Acute appendicitis, unspecified acute appendicitis type             Acute appendicitis with localized peritonitis, without perforation, abscess, or gangrene    Acute appendicitis  Plan: Laparoscopic possible open appendectomy  I have discussed the  A,.Alternatives B, benefits C, complications and risks such as the complications and risks which include the risk of perforation, leakage,bleeding, intra-abdominal organ injury, stenosis or ulcerations, the risk of deep vein thrombosis formation leading to possible pulmonary embolisms, and the risk of death.  I explained to the patient the possibility that this procedure may not be performed laparoscopically and may require being converted to an opened procedure or aborted due to abnormal anatomy. associated with discomfort possible open appendectomy procedure with the patient.  I have addressed the patient's questions and concerns to her satisfaction to obtain their informed consent.      Alexey Cruz MD  08/02/24  19:40 CDT

## 2024-08-03 NOTE — OP NOTE
Laparoscopic Appendectomy Possible Open     Preoperative diagnosis: Acute appendicitis    Postoperative diagnosis: as above      Indications: The patient was admitted to the hospital with presentation of an acute appendicitis.         Surgeon: Alexey Cruz MD     Assistants: Tech    Anesthesia: General endotracheal anesthesia    ASA Class: 3          Procedure Details       After the patient was explained the alternatives, benefits, complications, and risks of a laparoscopic appendectomy possible open including the risk of intra-abdominal organ injury such as small bowel and colon, postoperative infection as well as postoperative wound issues such as herniations was explained to the patient informed consent was provided and signed.  The patient was brought to the OR suite after receiving preoperative antibiotics, medications and DVT prophylaxis with SCDs were placed.  The patient was placed under general anesthesia.  Lawrence catheter was placed under a sterile fashion.  The patient was then prepped and draped in standard fashion.  We performed a surgical timeout.  I proceeded with a Veress needle approach in the left upper/subcostal location.  The Veress needle was used to insufflate the abdominal cavity to a pressure of 15 mmHg.  This was followed by placement of a 5 mm incision which was followed by placement of a 5 mm trocar into the abdominal cavity with camera assist in the periumbilical location.      The Veress needle was identified and removed safely.  A 5 mm incision was made in the left upper quadrant location for placement of a 5 mm trocar under direct visualization.  Entering the abdominal cavity there was significant defect into diffusions in the midline from her previous hysterectomy.  I placed a 12 mm incision for placement of a 5 mm trocar into this location to help facilitate taking down these adhesions utilizing harmonic scalpel.  Once these midline adhesions were taken down I was able to  visualize the cecum.  I placed a 5 mm trocar in the suprapubic location after making a 5 mm incision and then I exchanged the left lower quadrant trocar with the 12 mm trocar under direct visualization. The patient was placed in Trendelenburg position and rotated to the patient's left side.  Identified the ascending colon and followed up to the cecum.  Patient's cecum was redundant and the appendix was tethered to the abdominal wall.  Blunt dissection was performed to free the adhesions and suction was utilized to suction away any questionable purulent fluid.  The appendix was torsion and I bluntly dissected to expose the mesoappendix and base of the appendix.  I created a window through the area of the cecum and base of the appendix and fired a blue load stapler across to free the appendix from its cecal attachments.  The appendix base was not as grossly inflamed as the appendix tip.  I noticed mild oozing at the staple line and I placed a 5 mm clip across the area to control hemostasis..  The mesoappendix was inflamed and mildly edematous and I elevated the appendix to expose the mesoappendix and placed a white load across the mesoappendix appendix and fired to completely remove the appendix from its attachment.  Prior to firing I sure that there was no small bowel involved within the jaws of the stapling device.    The appendix was then removed safely from its attachments and placed in an Endo cinch bag.  It was removed through the large trocar site under direct visualization.  I then assessed the staple line to observe for hemostasis.  Once hemostasis was confirmed I then proceeded with suction of the area to remove any fluid grossly.  The base of the mesoappendix and stump of the appendix/cecal location was hemostatic.  The larger trocar site's fascia was reapproximated using a Randy-Evonne device and an 0 Vicryl suture.     Then re-locally anesthetized skin wounds for closure.  Skin wounds were closed with  4-0 Monocryl.  Prior to closing skin wounds all lap pads and attachments were accounted for at the end of the procedure.      White staples: 1  Blue staples: 1      Findings: Acute appendicitis    Estimated Blood Loss:  minimal           Specimens: Appendix             Implants:   Implant Name Type Inv. Item Serial No.  Lot No. LRB No. Used Action   CLIPAPPLR M/ ENDO [id number] 5MM 33CM MD/LG - [id number] Implant CLIPAPPLR M/ ENDO [id number] 5MM 33CM MD/LG  [facility]  DIV [employer] [id number] N/A 1 Implanted              Complications: None           Disposition: PACU - hemodynamically stable.           Condition: stable

## 2024-08-03 NOTE — PLAN OF CARE
Problem: Adult Inpatient Plan of Care  Goal: Plan of Care Review  Outcome: Ongoing, Progressing  Flowsheets (Taken 8/3/2024 0428)  Progress: no change  Plan of Care Reviewed With: patient  Outcome Evaluation: Patient arrived to the floor from the PACU, no complains of pain this far. Complains of nausea x2 this far. Lap x3 with tegaderm and gauze. IVF and IV abx infusing per order. VSS. Safety maintained.

## 2024-08-03 NOTE — ANESTHESIA PROCEDURE NOTES
Airway  Date/Time: 8/2/2024 8:22 PM  Airway not difficult    General Information and Staff    Patient location during procedure: OR  CRNA/CAA: Adithya Cortez, CRNA    Indications and Patient Condition  Indications for airway management: airway protection    Preoxygenated: yes  Mask difficulty assessment: 0 - not attempted    Final Airway Details  Final airway type: endotracheal airway      Successful airway: ETT  Cuffed: yes   Successful intubation technique: direct laryngoscopy  Endotracheal tube insertion site: oral  Blade: Mel  Blade size: 3  ETT size (mm): 7.0  Cormack-Lehane Classification: grade I - full view of glottis  Placement verified by: chest auscultation and capnometry   Cuff volume (mL): 6  Measured from: lips  ETT/EBT  to lips (cm): 21  Number of attempts at approach: 1    Additional Comments  ATRAUMATIC INTUBATION

## 2024-08-03 NOTE — PLAN OF CARE
Goal Outcome Evaluation:  Plan of Care Reviewed With: patient, spouse        Progress: improving     VSS.  RA.  Medicated for pain X2 and nausea X1 this shift.  Lap sites with gauze/tegaderm - CDI.  IVF as ordered.  500cc bolus.  Voiding small amounts.  Spouse at bedside.  C/O right pelvic pain - MD aware - Xray ordered.  SCDs.  Safety maintained.

## 2024-08-03 NOTE — PROGRESS NOTES
"Patient Care Team:  Jena Shen MD as PCP - General (Family Medicine)    Alton Ramos Suiter is POD 1 day ago from a laparoscopic appendectomy.  Patient's complaints of nausea and vomited x 2 1 last night and 1 this a.m.  She also complains of right hip pain which she noted after receiving her CT scan.  She states it is difficult for her to ambulate since.  Denies flatus or bowel movement.  Abdominal pain has improved.       Review of Systems:     Review of Systems - General ROS: negative  Respiratory ROS: no cough, shortness of breath, or wheezing  Cardiovascular ROS: no chest pain or dyspnea on exertion  Gastrointestinal ROS: positive for - abdominal pain and nausea/vomiting  negative for - blood in stools, diarrhea, or swallowing difficulty/pain  Musculoskeletal ROS: positive for - joint pain    Objective     Vital Signs  /56 (BP Location: Right arm, Patient Position: Lying)   Pulse 75   Temp 98.3 °F (36.8 °C) (Oral)   Resp 16   Ht 172.7 cm (68\")   Wt 68.9 kg (151 lb 14.4 oz)   SpO2 90%   BMI 23.10 kg/m²     Physical Exam:  Physical Exam  Constitutional:       Appearance: Normal appearance. She is normal weight.   Cardiovascular:      Rate and Rhythm: Normal rate and regular rhythm.   Pulmonary:      Effort: Pulmonary effort is normal.      Breath sounds: Normal breath sounds.   Abdominal:      General: Bowel sounds are decreased. There is no distension.      Palpations: Abdomen is soft. There is no shifting dullness.      Tenderness: There is abdominal tenderness. There is no guarding or rebound.          Comments: Wound dressing sites are clean dry and intact.  Mild tenderness in the left lower quadrant.   Neurological:      Mental Status: She is alert.           Results Review:      CBC          8/2/2024    16:25 8/3/2024    05:29   CBC   WBC 21.56  13.21    RBC 4.03  3.42    Hemoglobin 11.7  10.0    Hematocrit 37.1  32.6    MCV 92.1  95.3    MCH 29.0  29.2    MCHC 31.5  30.7    RDW " 12.6  13.1    Platelets 187  159       CMP          8/2/2024    16:25 8/3/2024    05:29   CMP   Glucose 184  136    BUN 16  19    Creatinine 0.97  1.08    EGFR 60.3  53.0    Sodium 136  139    Potassium 3.4  3.8    Chloride 96  101    Calcium 9.0  8.3    Total Protein 7.0  6.4    Albumin 3.5  3.0    Globulin 3.5  3.4    Total Bilirubin 0.8  0.6    Alkaline Phosphatase 104  96    AST (SGOT) 19  16    ALT (SGPT) 15  11    Albumin/Globulin Ratio 1.0  0.9    BUN/Creatinine Ratio 16.5  17.6    Anion Gap 14.0  10.0             Medication Reviewed:   Current Facility-Administered Medications   Medication Dose Route Frequency Provider Last Rate Last Admin    acetaminophen (TYLENOL) tablet 650 mg  650 mg Oral Q6H Alexey Cruz MD        dexAMETHasone (DECADRON) injection 4 mg  4 mg Intravenous Q8H PRN Alexey Cruz MD        diphenhydrAMINE (BENADRYL) injection 25 mg  25 mg Intravenous Q6H PRN Alexey Cruz MD        HYDROcodone-acetaminophen (NORCO) 5-325 MG per tablet 1 tablet  1 tablet Oral Q6H PRN Alexey Cruz MD        ketorolac (TORADOL) injection 30 mg  30 mg Intravenous Q8H PRN Alexey Cruz MD        labetalol (NORMODYNE,TRANDATE) injection 10 mg  10 mg Intravenous Q30 Min PRN Alexey Cruz MD        lactated ringers infusion  125 mL/hr Intravenous Continuous Alexey Cruz  mL/hr at 08/03/24 1207 125 mL/hr at 08/03/24 1207    losartan (COZAAR) tablet 25 mg  25 mg Oral Daily Alexey Cruz MD        metoclopramide (REGLAN) injection 5 mg  5 mg Intravenous Q8H Alexey Cruz MD   5 mg at 08/03/24 0412    metroNIDAZOLE (FLAGYL) IVPB 500 mg  500 mg Intravenous Q8H Alexey Cruz MD   Stopped at 08/03/24 1100    naloxone (NARCAN) injection 0.1 mg  0.1 mg Intravenous Q5 Min PRN Alexey Cruz MD        ondansetron (ZOFRAN) injection 4 mg  4 mg Intravenous Q6H PRN Alexey Cruz MD        pantoprazole (PROTONIX) EC tablet 40 mg  40 mg Oral Q AM Alexey Cruz MD   40 mg  at 08/03/24 0541    promethazine (PHENERGAN) 12.5 mg in sodium chloride 0.9 % 50 mL  12.5 mg Intravenous Q6H PRN Alexey Cruz MD        simethicone (MYLICON) chewable tablet 40 mg  40 mg Oral 4x Daily PRN Alexey Cruz MD        sodium chloride 0.9 % bolus 500 mL  500 mL Intravenous Once Alexey Cruz MD        sodium chloride 0.9 % flush 1-10 mL  1-10 mL Intravenous PRN Alexey Cruz MD        sodium chloride 0.9 % flush 10 mL  10 mL Intravenous Q12H Alexey Cruz MD   10 mL at 08/02/24 2343    sodium chloride 0.9 % infusion 40 mL  40 mL Intravenous PRN Alexey Cruz MD        traMADol (ULTRAM) tablet 50 mg  50 mg Oral Q6H PRN Alexey Cruz MD           Assessment & Plan    POD 1 day ago from the above procedure with postoperative ileus.  Encourage incentive spirometer use, ambulate and will await GI function to progress.  Laboratory work is improved with respect to her leukocytosis.  Will complete antibiotic regimen and expected to end today.  Right hip pain I have ordered an x-ray of the pelvis and physical therapy consultation.  Explained plan and expectations with the patient and her .  Addressed their questions or concerns to their satisfaction.      Alexey Cruz MD  08/03/24  12:40 CDT

## 2024-08-03 NOTE — ED NOTES
Patient placed in a gown, all clothing in a patient belongings bag.  Patients dentures removed, and placed in denture cup with patient sticker.  Patients 2 rings placed in a denture cup with patient sticker.  Rings on right hand could not be removed, rings taped over at this time.  All patient belongings given to patients  at bedside.

## 2024-08-03 NOTE — ANESTHESIA PREPROCEDURE EVALUATION
Anesthesia Evaluation     NPO Solid Status: Waived due to emergency  NPO Liquid Status: Waived due to emergency           Airway   Mallampati: I  TM distance: >3 FB  Neck ROM: full  No difficulty expected  Dental    (+) edentulous    Pulmonary - negative pulmonary ROS and normal exam   Cardiovascular - normal exam  Exercise tolerance: good (4-7 METS)    (+) hypertension      Neuro/Psych  GI/Hepatic/Renal/Endo    (+) GERD    Musculoskeletal     Abdominal  - normal exam   Substance History      OB/GYN          Other                    Anesthesia Plan    ASA 2 - emergent     general     intravenous induction     Anesthetic plan, risks, benefits, and alternatives have been provided, discussed and informed consent has been obtained with: patient.    CODE STATUS:

## 2024-08-04 ENCOUNTER — APPOINTMENT (OUTPATIENT)
Dept: GENERAL RADIOLOGY | Facility: HOSPITAL | Age: 79
DRG: 398 | End: 2024-08-04
Payer: MEDICARE

## 2024-08-04 LAB
ALBUMIN SERPL-MCNC: 3 G/DL (ref 3.5–5.2)
ALBUMIN/GLOB SERPL: 0.8 G/DL
ALP SERPL-CCNC: 90 U/L (ref 39–117)
ALT SERPL W P-5'-P-CCNC: 8 U/L (ref 1–33)
ANION GAP SERPL CALCULATED.3IONS-SCNC: 9 MMOL/L (ref 5–15)
AST SERPL-CCNC: 16 U/L (ref 1–32)
BILIRUB SERPL-MCNC: 0.4 MG/DL (ref 0–1.2)
BUN SERPL-MCNC: 24 MG/DL (ref 8–23)
BUN/CREAT SERPL: 23.8 (ref 7–25)
CALCIUM SPEC-SCNC: 8.6 MG/DL (ref 8.6–10.5)
CHLORIDE SERPL-SCNC: 102 MMOL/L (ref 98–107)
CO2 SERPL-SCNC: 28 MMOL/L (ref 22–29)
CREAT SERPL-MCNC: 1.01 MG/DL (ref 0.57–1)
DEPRECATED RDW RBC AUTO: 45.1 FL (ref 37–54)
EGFRCR SERPLBLD CKD-EPI 2021: 57.5 ML/MIN/1.73
ERYTHROCYTE [DISTWIDTH] IN BLOOD BY AUTOMATED COUNT: 12.9 % (ref 12.3–15.4)
GLOBULIN UR ELPH-MCNC: 3.6 GM/DL
GLUCOSE SERPL-MCNC: 165 MG/DL (ref 65–99)
HCT VFR BLD AUTO: 36.8 % (ref 34–46.6)
HGB BLD-MCNC: 11.1 G/DL (ref 12–15.9)
MCH RBC QN AUTO: 28.7 PG (ref 26.6–33)
MCHC RBC AUTO-ENTMCNC: 30.2 G/DL (ref 31.5–35.7)
MCV RBC AUTO: 95.1 FL (ref 79–97)
PLATELET # BLD AUTO: 167 10*3/MM3 (ref 140–450)
PMV BLD AUTO: 12.3 FL (ref 6–12)
POTASSIUM SERPL-SCNC: 3.8 MMOL/L (ref 3.5–5.2)
PROT SERPL-MCNC: 6.6 G/DL (ref 6–8.5)
RBC # BLD AUTO: 3.87 10*6/MM3 (ref 3.77–5.28)
SODIUM SERPL-SCNC: 139 MMOL/L (ref 136–145)
WBC NRBC COR # BLD AUTO: 14.08 10*3/MM3 (ref 3.4–10.8)

## 2024-08-04 PROCEDURE — 25010000002 METOCLOPRAMIDE PER 10 MG: Performed by: SURGERY

## 2024-08-04 PROCEDURE — 85027 COMPLETE CBC AUTOMATED: CPT | Performed by: SURGERY

## 2024-08-04 PROCEDURE — 80053 COMPREHEN METABOLIC PANEL: CPT | Performed by: SURGERY

## 2024-08-04 PROCEDURE — 25010000002 DEXAMETHASONE PER 1 MG: Performed by: SURGERY

## 2024-08-04 PROCEDURE — 97116 GAIT TRAINING THERAPY: CPT

## 2024-08-04 PROCEDURE — 97161 PT EVAL LOW COMPLEX 20 MIN: CPT

## 2024-08-04 PROCEDURE — 74018 RADEX ABDOMEN 1 VIEW: CPT

## 2024-08-04 PROCEDURE — 25010000002 METRONIDAZOLE 500 MG/100ML SOLUTION: Performed by: SURGERY

## 2024-08-04 PROCEDURE — 25010000002 LABETALOL 5 MG/ML SOLUTION: Performed by: SURGERY

## 2024-08-04 PROCEDURE — 25010000002 ONDANSETRON PER 1 MG: Performed by: SURGERY

## 2024-08-04 PROCEDURE — 25810000003 LACTATED RINGERS PER 1000 ML: Performed by: SURGERY

## 2024-08-04 PROCEDURE — 25010000002 PROMETHAZINE PER 50 MG: Performed by: SURGERY

## 2024-08-04 PROCEDURE — 25010000002 CEFAZOLIN PER 500 MG: Performed by: SURGERY

## 2024-08-04 RX ORDER — METRONIDAZOLE 500 MG/100ML
500 INJECTION, SOLUTION INTRAVENOUS EVERY 8 HOURS
Status: COMPLETED | OUTPATIENT
Start: 2024-08-04 | End: 2024-08-07

## 2024-08-04 RX ADMIN — SODIUM CHLORIDE, POTASSIUM CHLORIDE, SODIUM LACTATE AND CALCIUM CHLORIDE 125 ML/HR: 600; 310; 30; 20 INJECTION, SOLUTION INTRAVENOUS at 01:19

## 2024-08-04 RX ADMIN — LABETALOL HYDROCHLORIDE 10 MG: 5 INJECTION, SOLUTION INTRAVENOUS at 22:30

## 2024-08-04 RX ADMIN — METOCLOPRAMIDE HYDROCHLORIDE 5 MG: 5 INJECTION INTRAMUSCULAR; INTRAVENOUS at 13:17

## 2024-08-04 RX ADMIN — LABETALOL HYDROCHLORIDE 10 MG: 5 INJECTION, SOLUTION INTRAVENOUS at 16:54

## 2024-08-04 RX ADMIN — ONDANSETRON 4 MG: 2 INJECTION INTRAMUSCULAR; INTRAVENOUS at 02:49

## 2024-08-04 RX ADMIN — METRONIDAZOLE 500 MG: 500 INJECTION, SOLUTION INTRAVENOUS at 17:21

## 2024-08-04 RX ADMIN — DEXAMETHASONE SODIUM PHOSPHATE 4 MG: 4 INJECTION, SOLUTION INTRA-ARTICULAR; INTRALESIONAL; INTRAMUSCULAR; INTRAVENOUS; SOFT TISSUE at 15:29

## 2024-08-04 RX ADMIN — ACETAMINOPHEN 650 MG: 325 TABLET ORAL at 13:21

## 2024-08-04 RX ADMIN — CEFAZOLIN 1000 MG: 1 INJECTION, POWDER, FOR SOLUTION INTRAMUSCULAR; INTRAVENOUS at 09:14

## 2024-08-04 RX ADMIN — CEFAZOLIN 1000 MG: 1 INJECTION, POWDER, FOR SOLUTION INTRAMUSCULAR; INTRAVENOUS at 16:54

## 2024-08-04 RX ADMIN — SODIUM CHLORIDE, POTASSIUM CHLORIDE, SODIUM LACTATE AND CALCIUM CHLORIDE 125 ML/HR: 600; 310; 30; 20 INJECTION, SOLUTION INTRAVENOUS at 09:13

## 2024-08-04 RX ADMIN — PROMETHAZINE HYDROCHLORIDE 12.5 MG: 25 INJECTION INTRAMUSCULAR; INTRAVENOUS at 13:17

## 2024-08-04 RX ADMIN — Medication 10 ML: at 20:41

## 2024-08-04 RX ADMIN — METOCLOPRAMIDE HYDROCHLORIDE 5 MG: 5 INJECTION INTRAMUSCULAR; INTRAVENOUS at 20:41

## 2024-08-04 RX ADMIN — SODIUM CHLORIDE, POTASSIUM CHLORIDE, SODIUM LACTATE AND CALCIUM CHLORIDE 125 ML/HR: 600; 310; 30; 20 INJECTION, SOLUTION INTRAVENOUS at 19:17

## 2024-08-04 RX ADMIN — ACETAMINOPHEN 650 MG: 325 TABLET ORAL at 08:22

## 2024-08-04 RX ADMIN — METOCLOPRAMIDE HYDROCHLORIDE 5 MG: 5 INJECTION INTRAMUSCULAR; INTRAVENOUS at 03:58

## 2024-08-04 RX ADMIN — ONDANSETRON 4 MG: 2 INJECTION INTRAMUSCULAR; INTRAVENOUS at 11:02

## 2024-08-04 RX ADMIN — LOSARTAN POTASSIUM 25 MG: 50 TABLET, FILM COATED ORAL at 08:22

## 2024-08-04 RX ADMIN — METRONIDAZOLE 500 MG: 500 INJECTION, SOLUTION INTRAVENOUS at 10:13

## 2024-08-04 RX ADMIN — PROMETHAZINE HYDROCHLORIDE 12.5 MG: 25 INJECTION INTRAMUSCULAR; INTRAVENOUS at 05:30

## 2024-08-04 NOTE — PLAN OF CARE
"Goal Outcome Evaluation:  Plan of Care Reviewed With: patient, spouse        Progress: no change     NG placed this AM by Dr Cruz - Immediate return of approx 1000cc green gastric contents.  NPO with ice chips.  IVF and abx as ordered.  Denies pain.  C/O significant nausea and dry heaves.  Medicated frequently for nausea.  BM x2 today.  Patient extremely tearful and agitated this evening - begging for me to call Dr Cruz to \"get this tube out\".  Reached out to Dr Cruz and he spoke to patient by phone in room.  Patient currently resting in bed.   at bedside.  Per order, clamped NG tube at 1800. Safety maintained. Dressing CDI to abdomen.  BP elevated this evening - medicated with PRN Labetalol.                                "

## 2024-08-04 NOTE — THERAPY EVALUATION
Patient Name: Rachel Zaragoza  : 1945    MRN: [id number]                              Today's Date: 2024       Admit Date: 2024    Visit Dx:     ICD-10-CM ICD-9-CM   1. Acute appendicitis with localized peritonitis, without perforation, abscess, or gangrene  K35.30 540.9   2. Acute appendicitis, unspecified acute appendicitis type  K35.80 540.9   3. Appendicitis, acute  K35.80 540.9   4. Impaired mobility [Z74.09]  Z74.09 799.89     Patient Active Problem List   Diagnosis    Acute appendicitis with localized peritonitis, without perforation, abscess, or gangrene    Acute appendicitis    Status post laparoscopic appendectomy     Past Medical History:   Diagnosis Date    Hypertension      Past Surgical History:   Procedure Laterality Date    HYSTERECTOMY        General Information       Row Name 24 1049          Physical Therapy Time and Intention    Document Type evaluation  Sx:  lap appendectomy 24  -     Mode of Treatment physical therapy  -       Row Name 24 1049          General Information    Patient Profile Reviewed yes  -     Prior Level of Function independent:;community mobility;ADL's;driving;shopping;home management;cooking;cleaning  tub  -     Existing Precautions/Restrictions --  NG tube  -     Barriers to Rehab none identified  -       Row Name 24 1049          Living Environment    People in Home spouse  -       Row Name 24 1049          Home Main Entrance    Number of Stairs, Main Entrance three  -     Stair Railings, Main Entrance railing on left side (ascending)  -       Row Name 24 1049          Stairs Within Home, Primary    Number of Stairs, Within Home, Primary none  -       Row Name 24 1049          Cognition    Orientation Status (Cognition) oriented x 4  -       Row Name 24 1049          Safety Issues, Functional Mobility    Safety Issues Affecting Function (Mobility) ability to follow commands  -                User Key  (r) = Recorded By, (t) = Taken By, (c) = Cosigned By      Initials Name Provider Type     Hoang Tom, PT Physical Therapist                   Mobility       Row Name 08/04/24 1049          Bed Mobility    Bed Mobility supine-sit;sit-supine  -     Supine-Sit Hawkins (Bed Mobility) minimum assist (75% patient effort)  -     Sit-Supine Hawkins (Bed Mobility) standby assist  -     Assistive Device (Bed Mobility) head of bed elevated  -[facility] Name 08/04/24 1049          Sit-Stand Transfer    Sit-Stand Hawkins (Transfers) contact guard  -[facility] Name 08/04/24 1049          Gait/Stairs (Locomotion)    Hawkins Level (Gait) contact guard  -     Distance in Feet (Gait) 120  x2. 1 sitting rest break  -     Deviations/Abnormal Patterns (Gait) gait speed decreased  -     Left Sided Gait Deviations forward flexed posture  -               User Key  (r) = Recorded By, (t) = Taken By, (c) = Cosigned By      Initials Name Provider Type     Hoang Tom, PT Physical Therapist                   Obj/Interventions       [facility] Name 08/04/24 1049          Range of Motion Comprehensive    General Range of Motion bilateral upper extremity ROM WFL;bilateral lower extremity ROM WFL  -[facility] Name 08/04/24 1049          Strength Comprehensive (MMT)    General Manual Muscle Testing (MMT) Assessment no strength deficits identified  -               User Key  (r) = Recorded By, (t) = Taken By, (c) = Cosigned By      Initials Name Provider Type     Hoang Tom, PT Physical Therapist                   Goals/Plan       [facility] Name 08/04/24 1049          Bed Mobility Goal 1 (PT)    Activity/Assistive Device (Bed Mobility Goal 1, PT) bed mobility activities, all  -     Hawkins Level/Cues Needed (Bed Mobility Goal 1, PT) independent  -     Time Frame (Bed Mobility Goal 1, PT) 10 days  -     Progress/Outcomes (Bed Mobility Goal 1, PT) new goal  -[facility] Name 08/04/24 1049           Transfer Goal 1 (PT)    Activity/Assistive Device (Transfer Goal 1, PT) sit-to-stand/stand-to-sit  -     Haskell Level/Cues Needed (Transfer Goal 1, PT) independent  -LH     Time Frame (Transfer Goal 1, PT) 10 days  -     Progress/Outcome (Transfer Goal 1, PT) new goal  -       Row Name 08/04/24 1049          Gait Training Goal 1 (PT)    Activity/Assistive Device (Gait Training Goal 1, PT) gait (walking locomotion)  -     Haskell Level (Gait Training Goal 1, PT) independent  -     Distance (Gait Training Goal 1, PT) 525ft  -     Time Frame (Gait Training Goal 1, PT) 10 days  -     Progress/Outcome (Gait Training Goal 1, PT) new goal  -       Row Name 08/04/24 1049          Stairs Goal 1 (PT)    Activity/Assistive Device (Stairs Goal 1, PT) ascending stairs;descending stairs;using handrail, left;using handrail, right  -     Haskell Level/Cues Needed (Stairs Goal 1, PT) independent  -     Number of Stairs (Stairs Goal 1, PT) 3  -LH     Time Frame (Stairs Goal 1, PT) 10 days  -     Progress/Outcome (Stairs Goal 1, PT) new goal  -       Row Name 08/04/24 1049          Therapy Assessment/Plan (PT)    Planned Therapy Interventions (PT) gait training;patient/family education;stair training  -               User Key  (r) = Recorded By, (t) = Taken By, (c) = Cosigned By      Initials Name Provider Type     Hoang Tom, PT Physical Therapist                   Clinical Impression       Row Name 08/04/24 1049          Pain    Pretreatment Pain Rating 5/10  -     Posttreatment Pain Rating 5/10  -     Pain Location - abdomen  -     Pain Intervention(s) Medication (See MAR);Repositioned;Ambulation/increased activity  -       Row Name 08/04/24 1049          Plan of Care Review    Plan of Care Reviewed With patient  -     Outcome Evaluation PT IE complete.  A&Ox4.  C/o 5/10 abdominal pain.  Pt is independent PLOF.  Today she is min A to sit EOB.  CGA sit<>stand.   Ambulated 120ft x2 CGA x1.  1 sitting rest break in between.  PT to see for progressive ambulation.  Recommend home with  at [place].  Thank you for referral.  -       Row Name 08/04/24 1049          Therapy Assessment/Plan (PT)    Patient/Family Therapy Goals Statement (PT) return home  -     Rehab Potential (PT) good, to achieve stated therapy goals  -     Criteria for Skilled Interventions Met (PT) yes;skilled treatment is necessary  -     Therapy Frequency (PT) 2 times/day  -     Predicted Duration of Therapy Intervention (PT) until dc  -       Row Name 08/04/24 1049          Positioning and Restraints    Pre-Treatment Position in bed  -     Post Treatment Position bed  -     In Bed fowlers;call light within reach;encouraged to call for assist;side rails up x2  -               User Key  (r) = Recorded By, (t) = Taken By, (c) = Cosigned By      Initials Name Provider Type     Hoang Tom, PT Physical Therapist                   Outcome Measures       Row Name 08/04/24 1049          How much help from another person do you currently need...    Turning from your back to your side while in flat bed without using bedrails? 4  -LH     Moving from lying on back to sitting on the side of a flat bed without bedrails? 3  -LH     Moving to and from a bed to a chair (including a wheelchair)? 3  -LH     Standing up from a chair using your arms (e.g., wheelchair, bedside chair)? 3  -LH     Climbing 3-5 steps with a railing? 3  -LH     To walk in hospital room? 3  -     AM-PAC 6 Clicks Score (PT) 19  -     Highest Level of Mobility Goal 6 --> Walk 10 steps or more  -       Row Name 08/04/24 1049          Functional Assessment    Outcome Measure Options AM-PAC 6 Clicks Basic Mobility (PT)  -               User Key  (r) = Recorded By, (t) = Taken By, (c) = Cosigned By      Initials Name Provider Type    Hoang Alegria, PT Physical Therapist                                 Physical Therapy  Education       Title: PT OT SLP Therapies (Done)       Topic: Physical Therapy (Done)       Point: Mobility training (Done)       Learning Progress Summary             Patient Acceptance, D,E, VU,DU by  at 8/4/2024 1057    Comment: benefits of PT and POC, call for A OOB                         Point: Precautions (Done)       Learning Progress Summary             Patient Acceptance, D,E, VU,DU by  at 8/4/2024 1057    Comment: benefits of PT and POC, call for A OOB                                         User Key       Initials Effective Dates Name Provider Type [facility] 02/03/23 -  Hoang Tom, PT Physical Therapist PT                  PT Recommendation and Plan  Planned Therapy Interventions (PT): gait training, patient/family education, stair training  Plan of Care Reviewed With: patient  Outcome Evaluation: PT IE complete.  A&Ox4.  C/o 5/10 abdominal pain.  Pt is independent PLOF.  Today she is min A to sit EOB.  CGA sit<>stand.  Ambulated 120ft x2 CGA x1.  1 sitting rest break in between.  PT to see for progressive ambulation.  Recommend home with  at [place].  Thank you for referral.     Time Calculation:         PT Charges       Row Name 08/04/24 1049             Time Calculation    Start Time 1049  -      Stop Time 1131  -      Time Calculation (min) 42 min  -      PT Received On 08/04/24  -      PT Goal Re-Cert Due Date 08/14/24  -         Untimed Charges    PT Eval/Re-eval Minutes 42  -         Total Minutes    Untimed Charges Total Minutes 42  -       Total Minutes 42  -                User Key  (r) = Recorded By, (t) = Taken By, (c) = Cosigned By      Initials Name Provider Type     Hoang Tom, PT Physical Therapist                  Therapy Charges for Today       Code Description Service Date Service Provider Modifiers Qty    00977476714 HC PT EVAL LOW COMPLEXITY 3 8/4/2024 Hoang Tom, PT GP 1            PT G-Codes  Outcome Measure Options: AM-PAC 6 Clicks Basic  Mobility (PT)  AM-PAC 6 Clicks Score (PT): 19  PT Discharge Summary  Anticipated Discharge Disposition (PT): home    Hoang Tom, PT  8/4/2024

## 2024-08-04 NOTE — NURSING NOTE
Radiology notified of stat imaging to confirm NG placement. Provider placed NG.     0704 radiology at bedside now.

## 2024-08-04 NOTE — PROGRESS NOTES
"Patient Care Team:  Jena Shen MD as PCP - General (Family Medicine)    Alton Ramos Suiter is POD 2 days ago from laparoscopic appendectomy.  Postoperative ileus presented.  Patient had multiple bouts of vomiting last night.  Positive flatus reported by the patient this a.m.  no bowel movement.  Patient complains of nausea.       Review of Systems:     Review of Systems - General ROS: positive for  - fatigue  Respiratory ROS: no cough, shortness of breath, or wheezing  Cardiovascular ROS: no chest pain or dyspnea on exertion  Gastrointestinal ROS: positive for - abdominal pain, gas/bloating, and nausea/vomiting    Objective     Vital Signs  /89 (BP Location: Right arm, Patient Position: Lying)   Pulse 85   Temp 97.7 °F (36.5 °C) (Oral)   Resp 18   Ht 172.7 cm (68\")   Wt 68.9 kg (151 lb 14.4 oz)   SpO2 95%   BMI 23.10 kg/m²     Physical Exam:  Physical Exam  Constitutional:       General: She is not in acute distress.     Appearance: She is normal weight. She is not ill-appearing.   Cardiovascular:      Rate and Rhythm: Normal rate and regular rhythm.      Pulses: Normal pulses.      Heart sounds: Normal heart sounds.   Abdominal:      General: Bowel sounds are decreased. There is distension.      Tenderness: There is abdominal tenderness. There is no guarding or rebound.          Comments: Wound dressings clean dry and intact.  Tenderness specifically at incision sites appropriately.  Tenderness also right lower quadrant.           Results Review:    Labs reviewed  Lab Results (last 24 hours)       Procedure Component Value Units Date/Time    Comprehensive Metabolic Panel [[id number]]  (Abnormal) Collected: 08/04/24 0557    Specimen: Blood Updated: 08/04/24 0652     Glucose 165 mg/dL      BUN 24 mg/dL      Creatinine 1.01 mg/dL      Sodium 139 mmol/L      Potassium 3.8 mmol/L      Chloride 102 mmol/L      CO2 28.0 mmol/L      Calcium 8.6 mg/dL      Total Protein 6.6 g/dL      Albumin 3.0 " g/dL      ALT (SGPT) 8 U/L      AST (SGOT) 16 U/L      Alkaline Phosphatase 90 U/L      Total Bilirubin 0.4 mg/dL      Globulin 3.6 gm/dL      A/G Ratio 0.8 g/dL      BUN/Creatinine Ratio 23.8     Anion Gap 9.0 mmol/L      eGFR 57.5 mL/min/1.73     Narrative:      GFR Normal >60  Chronic Kidney Disease <60  Kidney Failure <15    The GFR formula is only valid for adults with stable renal function between ages 18 and 70.    CBC (No Diff) [[id number]]  (Abnormal) Collected: 08/04/24 0557    Specimen: Blood Updated: 08/04/24 0631     WBC 14.08 10*3/mm3      RBC 3.87 10*6/mm3      Hemoglobin 11.1 g/dL      Hematocrit 36.8 %      MCV 95.1 fL      MCH 28.7 pg      MCHC 30.2 g/dL      RDW 12.9 %      RDW-SD 45.1 fl      MPV 12.3 fL      Platelets 167 10*3/mm3               Medication Reviewed:   Current Facility-Administered Medications   Medication Dose Route Frequency Provider Last Rate Last Admin    acetaminophen (TYLENOL) tablet 650 mg  650 mg Oral Q6H Alexey Cruz MD   650 mg at 08/03/24 1314    dexAMETHasone (DECADRON) injection 4 mg  4 mg Intravenous Q8H PRN Alexey Cruz MD   4 mg at 08/03/24 2138    diphenhydrAMINE (BENADRYL) injection 25 mg  25 mg Intravenous Q6H PRN Alexey Cruz MD        HYDROcodone-acetaminophen (NORCO) 5-325 MG per tablet 1 tablet  1 tablet Oral Q6H PRN Alexey Cruz MD        labetalol (NORMODYNE,TRANDATE) injection 10 mg  10 mg Intravenous Q30 Min PRN Alexey Cruz MD        lactated ringers infusion  125 mL/hr Intravenous Continuous Alexey Cruz  mL/hr at 08/04/24 0813 125 mL/hr at 08/04/24 0813    losartan (COZAAR) tablet 25 mg  25 mg Oral Daily Alexey Cruz MD        metoclopramide (REGLAN) injection 5 mg  5 mg Intravenous Q8H Alexey Cruz MD   5 mg at 08/04/24 0358    naloxone (NARCAN) injection 0.1 mg  0.1 mg Intravenous Q5 Min PRN Alexey Cruz MD        ondansetron (ZOFRAN) injection 4 mg  4 mg Intravenous Q6H PRN Alexey Cruz MD   4  mg at 08/04/24 0249    pantoprazole (PROTONIX) EC tablet 40 mg  40 mg Oral Q AM Alexey Cruz MD   40 mg at 08/03/24 0541    promethazine (PHENERGAN) 12.5 mg in sodium chloride 0.9 % 50 mL  12.5 mg Intravenous Q6H PRN Alexey Cruz MD   12.5 mg at 08/04/24 0530    simethicone (MYLICON) chewable tablet 40 mg  40 mg Oral 4x Daily PRN Alexey Cruz MD        sodium chloride 0.9 % flush 1-10 mL  1-10 mL Intravenous PRN Alexey Cruz MD        sodium chloride 0.9 % flush 10 mL  10 mL Intravenous Q12H Alexey Cruz MD   10 mL at 08/03/24 2111    sodium chloride 0.9 % infusion 40 mL  40 mL Intravenous PRN Alexey Cruz MD        traMADol (ULTRAM) tablet 50 mg  50 mg Oral Q6H PRN Alexey Cruz MD           Assessment & Plan  POD 2 days ago from laparoscopic appendectomy.  Currently presents as a postoperative ileus.  NG tube was placed due to symptoms and explained to the patient her findings of a hiatal hernia.  NG tube placement was performed by Dr. Cruz due to the findings on CT of a hiatal hernia.  Patient tolerated procedure.  Approximately 1 L of gastric fluids evacuated.  Will await GI status improved.  Encourage ambulation and utilization of incentive spirometer we will keep n.p.o. with the exceptions of ice chips and sips and sips with meds.  Will repeat labs to follow trends.      Alexey Cruz MD  08/04/24  08:16 CDT

## 2024-08-04 NOTE — PLAN OF CARE
Goal Outcome Evaluation:         Patient A/O x4 this shift. Patient treated multiple times both per MAR and by non-pharmacological means (cool cloth, breathing, etc) for nausea/vomiting control. Patient vomited numerous times overnight. MAR treatment for nausea had little effect except promethazine which worked the best. Patient got little sleep, but did get some sleep between bouts of nausea. Patient had very little intake due to nausea/vomiting. Patient able to ambulate multiple times x1 assist to the bathroom. IVF overnight. Patient able to reposition herself in bed. Awaiting follow up with provider in the a.m. Will update oncoming dayshift nurse at shift report in the a.m. as appropriate.

## 2024-08-04 NOTE — PLAN OF CARE
Problem: Adult Inpatient Plan of Care  Goal: Plan of Care Review  Recent Flowsheet Documentation  Taken 8/4/2024 1049 by Hoang Tom, PT  Plan of Care Reviewed With: patient  Outcome Evaluation: PT IE complete.  A&Ox4.  C/o 5/10 abdominal pain.  Pt is independent PLOF.  Today she is min A to sit EOB.  CGA sit<>stand.  Ambulated 120ft x2 CGA x1.  1 sitting rest break in between.  PT to see for progressive ambulation.  Re[place] home with  at [place].  Thank you for referral.   Goal Outcome Evaluation:  Plan of Care Reviewed With: patient           Outcome Evaluation: PT IE complete.  A&Ox4.  C/o 5/10 abdominal pain.  Pt is independent PLOF.  Today she is min A to sit EOB.  CGA sit<>stand.  Ambulated 120ft x2 CGA x1.  1 sitting rest break in between.  PT to see for progressive ambulation.  Recommend home with  at [place].  Thank you for referral.      Anticipated Discharge Disposition (PT): home

## 2024-08-05 ENCOUNTER — APPOINTMENT (OUTPATIENT)
Dept: GENERAL RADIOLOGY | Facility: HOSPITAL | Age: 79
DRG: 398 | End: 2024-08-05
Payer: MEDICARE

## 2024-08-05 LAB
ALBUMIN SERPL-MCNC: 2.7 G/DL (ref 3.5–5.2)
ALBUMIN/GLOB SERPL: 0.8 G/DL
ALP SERPL-CCNC: 81 U/L (ref 39–117)
ALT SERPL W P-5'-P-CCNC: 6 U/L (ref 1–33)
ANION GAP SERPL CALCULATED.3IONS-SCNC: 12 MMOL/L (ref 5–15)
AST SERPL-CCNC: 13 U/L (ref 1–32)
BILIRUB SERPL-MCNC: 0.2 MG/DL (ref 0–1.2)
BUN SERPL-MCNC: 25 MG/DL (ref 8–23)
BUN/CREAT SERPL: 32.1 (ref 7–25)
CALCIUM SPEC-SCNC: 8.3 MG/DL (ref 8.6–10.5)
CHLORIDE SERPL-SCNC: 103 MMOL/L (ref 98–107)
CO2 SERPL-SCNC: 26 MMOL/L (ref 22–29)
CREAT SERPL-MCNC: 0.78 MG/DL (ref 0.57–1)
DEPRECATED RDW RBC AUTO: 44.2 FL (ref 37–54)
EGFRCR SERPLBLD CKD-EPI 2021: 78.3 ML/MIN/1.73
ERYTHROCYTE [DISTWIDTH] IN BLOOD BY AUTOMATED COUNT: 12.9 % (ref 12.3–15.4)
GLOBULIN UR ELPH-MCNC: 3.6 GM/DL
GLUCOSE SERPL-MCNC: 159 MG/DL (ref 65–99)
HCT VFR BLD AUTO: 34 % (ref 34–46.6)
HGB BLD-MCNC: 10.7 G/DL (ref 12–15.9)
MCH RBC QN AUTO: 29.2 PG (ref 26.6–33)
MCHC RBC AUTO-ENTMCNC: 31.5 G/DL (ref 31.5–35.7)
MCV RBC AUTO: 92.9 FL (ref 79–97)
PLATELET # BLD AUTO: 205 10*3/MM3 (ref 140–450)
PMV BLD AUTO: 11.9 FL (ref 6–12)
POTASSIUM SERPL-SCNC: 3.4 MMOL/L (ref 3.5–5.2)
PROT SERPL-MCNC: 6.3 G/DL (ref 6–8.5)
RBC # BLD AUTO: 3.66 10*6/MM3 (ref 3.77–5.28)
SODIUM SERPL-SCNC: 141 MMOL/L (ref 136–145)
WBC NRBC COR # BLD AUTO: 13.2 10*3/MM3 (ref 3.4–10.8)

## 2024-08-05 PROCEDURE — 25010000002 METRONIDAZOLE 500 MG/100ML SOLUTION: Performed by: SURGERY

## 2024-08-05 PROCEDURE — 25810000003 LACTATED RINGERS PER 1000 ML: Performed by: SURGERY

## 2024-08-05 PROCEDURE — 80053 COMPREHEN METABOLIC PANEL: CPT | Performed by: SURGERY

## 2024-08-05 PROCEDURE — 25010000002 METOCLOPRAMIDE PER 10 MG: Performed by: SURGERY

## 2024-08-05 PROCEDURE — 97116 GAIT TRAINING THERAPY: CPT | Performed by: PHYSICAL THERAPIST

## 2024-08-05 PROCEDURE — 94799 UNLISTED PULMONARY SVC/PX: CPT

## 2024-08-05 PROCEDURE — 74019 RADEX ABDOMEN 2 VIEWS: CPT

## 2024-08-05 PROCEDURE — 85027 COMPLETE CBC AUTOMATED: CPT | Performed by: SURGERY

## 2024-08-05 PROCEDURE — 94761 N-INVAS EAR/PLS OXIMETRY MLT: CPT

## 2024-08-05 RX ORDER — SODIUM CHLORIDE 0.9 % (FLUSH) 0.9 %
10 SYRINGE (ML) INJECTION AS NEEDED
Status: DISCONTINUED | OUTPATIENT
Start: 2024-08-05 | End: 2024-08-09 | Stop reason: HOSPADM

## 2024-08-05 RX ORDER — METRONIDAZOLE 500 MG/100ML
500 INJECTION, SOLUTION INTRAVENOUS ONCE
Status: DISCONTINUED | OUTPATIENT
Start: 2024-08-05 | End: 2024-08-09 | Stop reason: HOSPADM

## 2024-08-05 RX ORDER — SODIUM CHLORIDE, SODIUM LACTATE, POTASSIUM CHLORIDE, CALCIUM CHLORIDE 600; 310; 30; 20 MG/100ML; MG/100ML; MG/100ML; MG/100ML
135 INJECTION, SOLUTION INTRAVENOUS CONTINUOUS
Status: DISCONTINUED | OUTPATIENT
Start: 2024-08-05 | End: 2024-08-07

## 2024-08-05 RX ORDER — SODIUM CHLORIDE 0.9 % (FLUSH) 0.9 %
10 SYRINGE (ML) INJECTION EVERY 12 HOURS SCHEDULED
Status: DISCONTINUED | OUTPATIENT
Start: 2024-08-05 | End: 2024-08-09 | Stop reason: HOSPADM

## 2024-08-05 RX ORDER — SCOLOPAMINE TRANSDERMAL SYSTEM 1 MG/1
1 PATCH, EXTENDED RELEASE TRANSDERMAL ONCE
Status: DISCONTINUED | OUTPATIENT
Start: 2024-08-05 | End: 2024-08-09 | Stop reason: HOSPADM

## 2024-08-05 RX ORDER — SODIUM CHLORIDE 9 MG/ML
40 INJECTION, SOLUTION INTRAVENOUS AS NEEDED
Status: DISCONTINUED | OUTPATIENT
Start: 2024-08-05 | End: 2024-08-08

## 2024-08-05 RX ADMIN — LOSARTAN POTASSIUM 25 MG: 50 TABLET, FILM COATED ORAL at 09:00

## 2024-08-05 RX ADMIN — Medication 10 ML: at 20:14

## 2024-08-05 RX ADMIN — METRONIDAZOLE 500 MG: 500 INJECTION, SOLUTION INTRAVENOUS at 01:10

## 2024-08-05 RX ADMIN — METRONIDAZOLE 500 MG: 500 INJECTION, SOLUTION INTRAVENOUS at 09:01

## 2024-08-05 RX ADMIN — SODIUM CHLORIDE, POTASSIUM CHLORIDE, SODIUM LACTATE AND CALCIUM CHLORIDE 125 ML/HR: 600; 310; 30; 20 INJECTION, SOLUTION INTRAVENOUS at 14:05

## 2024-08-05 RX ADMIN — METOCLOPRAMIDE HYDROCHLORIDE 5 MG: 5 INJECTION INTRAMUSCULAR; INTRAVENOUS at 11:48

## 2024-08-05 RX ADMIN — METOCLOPRAMIDE HYDROCHLORIDE 5 MG: 5 INJECTION INTRAMUSCULAR; INTRAVENOUS at 04:21

## 2024-08-05 RX ADMIN — METOCLOPRAMIDE HYDROCHLORIDE 5 MG: 5 INJECTION INTRAMUSCULAR; INTRAVENOUS at 20:11

## 2024-08-05 RX ADMIN — METRONIDAZOLE 500 MG: 500 INJECTION, SOLUTION INTRAVENOUS at 17:22

## 2024-08-05 RX ADMIN — SODIUM CHLORIDE, POTASSIUM CHLORIDE, SODIUM LACTATE AND CALCIUM CHLORIDE 125 ML/HR: 600; 310; 30; 20 INJECTION, SOLUTION INTRAVENOUS at 04:21

## 2024-08-05 RX ADMIN — ACETAMINOPHEN 650 MG: 325 TABLET ORAL at 20:11

## 2024-08-05 NOTE — PLAN OF CARE
Goal Outcome Evaluation:  Plan of Care Reviewed With: (P) patient, spouse        Progress: (P) improving  Outcome Evaluation: (P) Pt A&Ox4. NG tube d/c this shift. Tolerating clear liquids. IVF and abx infused per orders. Up w/ standby assist. BM this shift. Drsg C/D/I. No drainage.  at bedside. Call light within reach. Safety maintained.

## 2024-08-05 NOTE — PAYOR COMM NOTE
"Rachel Zaragoza (78 y.o. Female)    [id number]   admit 8/4 inpt    inpt order 8/4     [facility] phone    fax      Observation 8/2 and 8/3          Date of Birth   1945    Social Security Number       Address   [address] [place] [id number]    Home Phone   [phone]    MRN   [id number]       Taoist   Other    Marital Status                               Admission Date   8/2/24    Admission Type   Emergency    Admitting Provider   Alexey Cruz MD    Attending Provider   Alexey Cruz MD    Department, Room/Bed   [facility] 3C, 375/1       Discharge Date       Discharge Disposition       Discharge Destination                                 Attending Provider: Alexey Cruz MD    Allergies: No Known Allergies    Isolation: None   Infection: None   Code Status: CPR    Ht: 172.7 cm (68\")   Wt: 68.9 kg (151 lb 14.4 oz)    Admission Cmt: None   Principal Problem: Acute appendicitis [K35.80]                   Active Insurance as of 8/2/2024       Primary Coverage       Payor Plan Insurance Group Employer/Plan Group    ANTHEM MEDICARE REPLACEMENT ANTHEM MEDICARE ADVANTAGE [id number]       Payor Plan Address Payor Plan Phone Number Payor Plan Fax Number Effective Dates    [address] [phone]  1/1/2024 - None Entered    [facility] [id number]         Subscriber Name Subscriber Birth Date Member ID       RACHEL ZARAGOZA 1945 [id number]                     Emergency Contacts        (Rel.) Home Phone Work Phone Mobile Phone    RENETTA ZARAGOZA (Spouse) -- -- [phone]                 History & Physical        Alexey Cruz MD at 08/1945            H&P reviewed. The patient was examined and there are no changes to the H&P.          Electronically signed by Alexey Cruz MD at 08/1945   Source Note: H&P          General Surgery   History and Physical     Referring Provider: No ref. provider " found    Patient Care Team:  Jena Shen MD as PCP - General (Family Medicine)    Chief complaint: Right lower quadrant pain    Subjective     History of present illness:  The patient is a 78 y.o. female with complaints of right lower quadrant pain associated with nausea and vomiting.   Rachel Suiter yesterday the symptoms presented and did not resolve.  She stated she tried today and suffered nausea and vomiting symptoms.  Due to the symptoms not improving and progressing she visited our emergency department.    Review of Systems    Review of Systems - General ROS: negative  ENT ROS: negative  Respiratory ROS: no cough, shortness of breath, or wheezing  Cardiovascular ROS: no chest pain or dyspnea on exertion  Gastrointestinal ROS: positive for - abdominal pain and nausea/vomiting    History  Past Medical History:   Diagnosis Date    Hypertension    ,   Past Surgical History:   Procedure Laterality Date    HYSTERECTOMY     , History reviewed. No pertinent family history.,  , (Not in a hospital admission)   and Allergies:  Patient has no known allergies.    Current Facility-Administered Medications:     HYDROmorphone (DILAUDID) injection 0.5 mg, 0.5 mg, Intravenous, Once, Maria Teresa John APRN    ondansetron (ZOFRAN) injection 4 mg, 4 mg, Intravenous, Once, Maria Teresa John APRN    [COMPLETED] Insert Peripheral IV, , , Once **AND** sodium chloride 0.9 % flush 10 mL, 10 mL, Intravenous, PRN, Maria Teresa John APRN    Current Outpatient Medications:     losartan (COZAAR) 25 MG tablet, Take 1 tablet by mouth Daily., Disp: , Rfl:     omeprazole (priLOSEC) 40 MG capsule, Take 1 capsule by mouth Daily., Disp: , Rfl:     Objective    Vital Signs   Temp:  [98.6 °F (37 °C)] 98.6 °F (37 °C)  Heart Rate:  [81-96] 81  Resp:  [16-18] 18  BP: (124-133)/(68-73) 133/68    Physical Exam:  Physical Exam  Constitutional:       General: She is not in acute distress.     Appearance: She is normal weight. She is not  ill-appearing.   HENT:      Head: Normocephalic.   Cardiovascular:      Rate and Rhythm: Normal rate and regular rhythm.   Pulmonary:      Effort: Pulmonary effort is normal. No respiratory distress.      Breath sounds: Normal breath sounds.   Abdominal:      General: Abdomen is flat. Bowel sounds are normal. There is no distension.      Palpations: Abdomen is soft.      Tenderness: There is abdominal tenderness in the right lower quadrant. There is no guarding or rebound.          Results Review:     Lab Results (last 24 hours)       Procedure Component Value Units Date/Time    Urinalysis With Microscopic If Indicated (No Culture) - Urine, Clean Catch [[id number]]  (Abnormal) Collected: 08/02/24 1852    Specimen: Urine, Clean Catch Updated: 08/02/24 1928     Color, UA Dark Yellow     Appearance, UA Clear     pH, UA 5.5     Specific Gravity, UA >1.030     Glucose, UA Negative     Ketones, UA Negative     Bilirubin, UA Negative     Blood, UA Negative     Protein, UA 30 mg/dL (1+)     Leuk Esterase, UA Trace     Nitrite, UA Negative     Urobilinogen, UA 0.2 E.U./dL    Urinalysis, Microscopic Only - Urine, Clean Catch [[id number]]  (Abnormal) Collected: 08/02/24 1852    Specimen: Urine, Clean Catch Updated: 08/02/24 1928     RBC, UA 0-2 /HPF      WBC, UA 3-5 /HPF      Bacteria, UA 1+ /HPF      Squamous Epithelial Cells, UA 7-12 /HPF      Hyaline Casts, UA None Seen /LPF      Methodology Manual Light Microscopy    Comprehensive Metabolic Panel [[id number]]  (Abnormal) Collected: 08/02/24 1625    Specimen: Blood Updated: 08/02/24 1654     Glucose 184 mg/dL      BUN 16 mg/dL      Creatinine 0.97 mg/dL      Sodium 136 mmol/L      Potassium 3.4 mmol/L      Chloride 96 mmol/L      CO2 26.0 mmol/L      Calcium 9.0 mg/dL      Total Protein 7.0 g/dL      Albumin 3.5 g/dL      ALT (SGPT) 15 U/L      AST (SGOT) 19 U/L      Alkaline Phosphatase 104 U/L      Total Bilirubin 0.8 mg/dL      Globulin 3.5 gm/dL      A/G Ratio 1.0 g/dL       BUN/Creatinine Ratio 16.5     Anion Gap 14.0 mmol/L      eGFR 60.3 mL/min/1.73     Narrative:      GFR Normal >60  Chronic Kidney Disease <60  Kidney Failure <15    The GFR formula is only valid for adults with stable renal function between ages 18 and 70.    Lactic Acid, Plasma [[id number]]  (Normal) Collected: 08/02/24 1625    Specimen: Blood Updated: 08/02/24 1652     Lactate 1.4 mmol/L     Lipase [[id number]]  (Normal) Collected: 08/02/24 1625    Specimen: Blood Updated: 08/02/24 1649     Lipase 13 U/L     CBC & Differential [[id number]]  (Abnormal) Collected: 08/02/24 1625    Specimen: Blood Updated: 08/02/24 1636    Narrative:      The following orders were created for panel order CBC & Differential.  Procedure                               Abnormality         Status                     ---------                               -----------         ------                     CBC Auto Differential[[id number]]        Abnormal            Final result                 Please view results for these tests on the individual orders.    CBC Auto Differential [[id number]]  (Abnormal) Collected: 08/02/24 1625    Specimen: Blood Updated: 08/02/24 1636     WBC 21.56 10*3/mm3      RBC 4.03 10*6/mm3      Hemoglobin 11.7 g/dL      Hematocrit 37.1 %      MCV 92.1 fL      MCH 29.0 pg      MCHC 31.5 g/dL      RDW 12.6 %      RDW-SD 43.1 fl      MPV 12.1 fL      Platelets 187 10*3/mm3      Neutrophil % 86.9 %      Lymphocyte % 4.6 %      Monocyte % 7.3 %      Eosinophil % 0.2 %      Basophil % 0.2 %      Immature Grans % 0.8 %      Neutrophils, Absolute 18.71 10*3/mm3      Lymphocytes, Absolute 1.00 10*3/mm3      Monocytes, Absolute 1.58 10*3/mm3      Eosinophils, Absolute 0.05 10*3/mm3      Basophils, Absolute 0.04 10*3/mm3      Immature Grans, Absolute 0.18 10*3/mm3      nRBC 0.0 /100 WBC           Imaging Results (Last 24 Hours)       Procedure Component Value Units Date/Time    CT Abdomen Pelvis With Contrast [[id number]] Collected:  08/02/24 1834     Updated: 08/02/24 1845    Narrative:      EXAMINATION:  CT ABDOMEN PELVIS W CONTRAST-  8/2/2024 5:14 PM     HISTORY: Right lower quadrant abdominal pain.     TECHNIQUE: Spiral CT was performed of the abdomen and pelvis with IV  contrast. Multiplanar images were reconstructed.     DLP: 390.58 mGy.cm Automated dosage reduction technique was utilized to  reduce patient dose.     COMPARISON: No comparison study.      LUNG BASES: There is atelectasis in both lung bases. There is  cardiomegaly.     LIVER AND SPLEEN: There is fatty infiltration of the liver. There is a  1.6 cm low-density liver lesion in the right lobe image 25 series 2.  There are two 5 mm low-density lesions in the right hepatic lobe  laterally image 23 series 2. There is an additional 1.5 cm lesion in the  right hepatic lobe medially on the same image with suggestion of some  nodular enhancement along the wall. There is an additional 4 mm  low-density lesion in the right lobe laterally image 26 series 2. The  spleen is unremarkable. There are no dense gallstones.     PANCREAS: No pancreatic mass or inflammatory change.     KIDNEYS AND ADRENALS: The adrenal glands and left kidney are  unremarkable. There are 8 mm and 6 mm indeterminate small renal lesions  in the lateral right kidney on image 41 series 2. The ureters are  nondilated. There is thickening of the wall of the bladder. The bladder  is not well distended.     BOWEL: No oral contrast was given. Gastric wall thickening likely due to  under distention. Small hiatal hernia. Small bowel loops are nondilated.  There is thickening of the wall of the appendix. The appendix is dilated  measuring 12 mm. There is an appendicolith in the appendix. There is  extensive stranding of the fat adjacent to the appendix. There is a  small amount of free fluid in this area and in the pelvis. No abscess is  visualized. There is mild wall thickening of the adjacent colon wall  likely secondarily  inflamed. There is under distention of portions of  the colon.     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 Prior  hysterectomy. There are degenerative changes of the spine. No acute bony  abnormality is seen.          Impression:      1. Acute appendicitis with wall thickening and inflammatory changes in  the adjacent soft tissues. There is an appendicolith. There is some free  fluid in the right lower quadrant and pelvis related to the inflammatory  process. No perforation or abscess is visualized.  2. Fatty infiltration of the liver. There are scattered liver lesions  that are not fully evaluated on this examination. They are not simple  cyst. The differential includes hemangiomas or other lesions. Metastatic  disease cannot be excluded in a patient of this age. One of the lesions  does demonstrate a focal area of nodular enhancement along the periphery  of the lesion which can be suggestive of hemangioma. Some of the lesions  are very small. Liver mass MRI or CT is recommended to further evaluate  these lesions unless there are prior outside studies.  3. Prior hysterectomy. Atheromatous disease of the aortoiliac vessels.  Degenerative changes of the spine.        The full report of this exam was immediately signed and available to the  emergency room. The patient is currently in the emergency room.     This report was signed and finalized on 8/2/2024 6:42 PM by Dr. Rich Austin MD.               ASSESSMENT/PLAN   Diagnosis Plan   1. Acute appendicitis with localized peritonitis, without perforation, abscess, or gangrene  Case Request    Case Request      2. Acute appendicitis, unspecified acute appendicitis type             Acute appendicitis with localized peritonitis, without perforation, abscess, or gangrene    Acute appendicitis  Plan: Laparoscopic possible open appendectomy  I have discussed the A,.Alternatives B, benefits C, complications and risks such as the complications and  risks which include the risk of perforation, leakage,bleeding, intra-abdominal organ injury, stenosis or ulcerations, the risk of deep vein thrombosis formation leading to possible pulmonary embolisms, and the risk of death.  I explained to the patient the possibility that this procedure may not be performed laparoscopically and may require being converted to an opened procedure or aborted due to abnormal anatomy. associated with discomfort possible open appendectomy procedure with the patient.  I have addressed the patient's questions and concerns to her satisfaction to obtain their informed consent.      Alexey Cruz MD  08/02/24  19:40 CDT              Electronically signed by Alexey Cruz MD at 08/1945                 Alexey Cruz MD at 08/02/24 1940          General Surgery   History and Physical     Referring Provider: No ref. provider found    Patient Care Team:  Jena Shen MD as PCP - General (Family Medicine)    Chief complaint: Right lower quadrant pain    Subjective     History of present illness:  The patient is a 78 y.o. female with complaints of right lower quadrant pain associated with nausea and vomiting.   Rachel Suiter yesterday the symptoms presented and did not resolve.  She stated she tried today and suffered nausea and vomiting symptoms.  Due to the symptoms not improving and progressing she visited our emergency department.    Review of Systems    Review of Systems - General ROS: negative  ENT ROS: negative  Respiratory ROS: no cough, shortness of breath, or wheezing  Cardiovascular ROS: no chest pain or dyspnea on exertion  Gastrointestinal ROS: positive for - abdominal pain and nausea/vomiting    History  Past Medical History:   Diagnosis Date    Hypertension    ,   Past Surgical History:   Procedure Laterality Date    HYSTERECTOMY     , History reviewed. No pertinent family history.,  , (Not in a hospital admission)   and Allergies:  Patient has no known  allergies.    Current Facility-Administered Medications:     HYDROmorphone (DILAUDID) injection 0.5 mg, 0.5 mg, Intravenous, Once, Maria Teresa John APRN    ondansetron (ZOFRAN) injection 4 mg, 4 mg, Intravenous, Once, Maria Teresa John APRN    [COMPLETED] Insert Peripheral IV, , , Once **AND** sodium chloride 0.9 % flush 10 mL, 10 mL, Intravenous, PRN, Maria Teresa John APRN    Current Outpatient Medications:     losartan (COZAAR) 25 MG tablet, Take 1 tablet by mouth Daily., Disp: , Rfl:     omeprazole (priLOSEC) 40 MG capsule, Take 1 capsule by mouth Daily., Disp: , Rfl:     Objective    Vital Signs   Temp:  [98.6 °F (37 °C)] 98.6 °F (37 °C)  Heart Rate:  [81-96] 81  Resp:  [16-18] 18  BP: (124-133)/(68-73) 133/68    Physical Exam:  Physical Exam  Constitutional:       General: She is not in acute distress.     Appearance: She is normal weight. She is not ill-appearing.   HENT:      Head: Normocephalic.   Cardiovascular:      Rate and Rhythm: Normal rate and regular rhythm.   Pulmonary:      Effort: Pulmonary effort is normal. No respiratory distress.      Breath sounds: Normal breath sounds.   Abdominal:      General: Abdomen is flat. Bowel sounds are normal. There is no distension.      Palpations: Abdomen is soft.      Tenderness: There is abdominal tenderness in the right lower quadrant. There is no guarding or rebound.          Results Review:     Lab Results (last 24 hours)       Procedure Component Value Units Date/Time    Urinalysis With Microscopic If Indicated (No Culture) - Urine, Clean Catch [[id number]]  (Abnormal) Collected: 08/02/24 1852    Specimen: Urine, Clean Catch Updated: 08/02/24 1928     Color, UA Dark Yellow     Appearance, UA Clear     pH, UA 5.5     Specific Gravity, UA >1.030     Glucose, UA Negative     Ketones, UA Negative     Bilirubin, UA Negative     Blood, UA Negative     Protein, UA 30 mg/dL (1+)     Leuk Esterase, UA Trace     Nitrite, UA Negative     Urobilinogen, UA 0.2  E.U./dL    Urinalysis, Microscopic Only - Urine, Clean Catch [[id number]]  (Abnormal) Collected: 08/02/24 1852    Specimen: Urine, Clean Catch Updated: 08/02/24 1928     RBC, UA 0-2 /HPF      WBC, UA 3-5 /HPF      Bacteria, UA 1+ /HPF      Squamous Epithelial Cells, UA 7-12 /HPF      Hyaline Casts, UA None Seen /LPF      Methodology Manual Light Microscopy    Comprehensive Metabolic Panel [[id number]]  (Abnormal) Collected: 08/02/24 1625    Specimen: Blood Updated: 08/02/24 1654     Glucose 184 mg/dL      BUN 16 mg/dL      Creatinine 0.97 mg/dL      Sodium 136 mmol/L      Potassium 3.4 mmol/L      Chloride 96 mmol/L      CO2 26.0 mmol/L      Calcium 9.0 mg/dL      Total Protein 7.0 g/dL      Albumin 3.5 g/dL      ALT (SGPT) 15 U/L      AST (SGOT) 19 U/L      Alkaline Phosphatase 104 U/L      Total Bilirubin 0.8 mg/dL      Globulin 3.5 gm/dL      A/G Ratio 1.0 g/dL      BUN/Creatinine Ratio 16.5     Anion Gap 14.0 mmol/L      eGFR 60.3 mL/min/1.73     Narrative:      GFR Normal >60  Chronic Kidney Disease <60  Kidney Failure <15    The GFR formula is only valid for adults with stable renal function between ages 18 and 70.    Lactic Acid, Plasma [[id number]]  (Normal) Collected: 08/02/24 1625    Specimen: Blood Updated: 08/02/24 1652     Lactate 1.4 mmol/L     Lipase [[id number]]  (Normal) Collected: 08/02/24 1625    Specimen: Blood Updated: 08/02/24 1649     Lipase 13 U/L     CBC & Differential [[id number]]  (Abnormal) Collected: 08/02/24 1625    Specimen: Blood Updated: 08/02/24 1636    Narrative:      The following orders were created for panel order CBC & Differential.  Procedure                               Abnormality         Status                     ---------                               -----------         ------                     CBC Auto Differential[[id number]]        Abnormal            Final result                 Please view results for these tests on the individual orders.    CBC Auto Differential  [[id number]]  (Abnormal) Collected: 08/02/24 1625    Specimen: Blood Updated: 08/02/24 1636     WBC 21.56 10*3/mm3      RBC 4.03 10*6/mm3      Hemoglobin 11.7 g/dL      Hematocrit 37.1 %      MCV 92.1 fL      MCH 29.0 pg      MCHC 31.5 g/dL      RDW 12.6 %      RDW-SD 43.1 fl      MPV 12.1 fL      Platelets 187 10*3/mm3      Neutrophil % 86.9 %      Lymphocyte % 4.6 %      Monocyte % 7.3 %      Eosinophil % 0.2 %      Basophil % 0.2 %      Immature Grans % 0.8 %      Neutrophils, Absolute 18.71 10*3/mm3      Lymphocytes, Absolute 1.00 10*3/mm3      Monocytes, Absolute 1.58 10*3/mm3      Eosinophils, Absolute 0.05 10*3/mm3      Basophils, Absolute 0.04 10*3/mm3      Immature Grans, Absolute 0.18 10*3/mm3      nRBC 0.0 /100 WBC           Imaging Results (Last 24 Hours)       Procedure Component Value Units Date/Time    CT Abdomen Pelvis With Contrast [[id number]] Collected: 08/02/24 1834     Updated: 08/02/24 1845    Narrative:      EXAMINATION:  CT ABDOMEN PELVIS W CONTRAST-  8/2/2024 5:14 PM     HISTORY: Right lower quadrant abdominal pain.     TECHNIQUE: Spiral CT was performed of the abdomen and pelvis with IV  contrast. Multiplanar images were reconstructed.     DLP: 390.58 mGy.cm Automated dosage reduction technique was utilized to  reduce patient dose.     COMPARISON: No comparison study.      LUNG BASES: There is atelectasis in both lung bases. There is  cardiomegaly.     LIVER AND SPLEEN: There is fatty infiltration of the liver. There is a  1.6 cm low-density liver lesion in the right lobe image 25 series 2.  There are two 5 mm low-density lesions in the right hepatic lobe  laterally image 23 series 2. There is an additional 1.5 cm lesion in the  right hepatic lobe medially on the same image with suggestion of some  nodular enhancement along the wall. There is an additional 4 mm  low-density lesion in the right lobe laterally image 26 series 2. The  spleen is unremarkable. There are no dense gallstones.      PANCREAS: No pancreatic mass or inflammatory change.     KIDNEYS AND ADRENALS: The adrenal glands and left kidney are  unremarkable. There are 8 mm and 6 mm indeterminate small renal lesions  in the lateral right kidney on image 41 series 2. The ureters are  nondilated. There is thickening of the wall of the bladder. The bladder  is not well distended.     BOWEL: No oral contrast was given. Gastric wall thickening likely due to  under distention. Small hiatal hernia. Small bowel loops are nondilated.  There is thickening of the wall of the appendix. The appendix is dilated  measuring 12 mm. There is an appendicolith in the appendix. There is  extensive stranding of the fat adjacent to the appendix. There is a  small amount of free fluid in this area and in the pelvis. No abscess is  visualized. There is mild wall thickening of the adjacent colon wall  likely secondarily inflamed. There is under distention of portions of  the colon.     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 Prior  hysterectomy. There are degenerative changes of the spine. No acute bony  abnormality is seen.          Impression:      1. Acute appendicitis with wall thickening and inflammatory changes in  the adjacent soft tissues. There is an appendicolith. There is some free  fluid in the right lower quadrant and pelvis related to the inflammatory  process. No perforation or abscess is visualized.  2. Fatty infiltration of the liver. There are scattered liver lesions  that are not fully evaluated on this examination. They are not simple  cyst. The differential includes hemangiomas or other lesions. Metastatic  disease cannot be excluded in a patient of this age. One of the lesions  does demonstrate a focal area of nodular enhancement along the periphery  of the lesion which can be suggestive of hemangioma. Some of the lesions  are very small. Liver mass MRI or CT is recommended to further evaluate  these lesions unless  there are prior outside studies.  3. Prior hysterectomy. Atheromatous disease of the aortoiliac vessels.  Degenerative changes of the spine.        The full report of this exam was immediately signed and available to the  emergency room. The patient is currently in the emergency room.     This report was signed and finalized on 8/2/2024 6:42 PM by Dr. Rich Austin MD.               ASSESSMENT/PLAN   Diagnosis Plan   1. Acute appendicitis with localized peritonitis, without perforation, abscess, or gangrene  Case Request    Case Request      2. Acute appendicitis, unspecified acute appendicitis type             Acute appendicitis with localized peritonitis, without perforation, abscess, or gangrene    Acute appendicitis  Plan: Laparoscopic possible open appendectomy  I have discussed the A,.Alternatives B, benefits C, complications and risks such as the complications and risks which include the risk of perforation, leakage,bleeding, intra-abdominal organ injury, stenosis or ulcerations, the risk of deep vein thrombosis formation leading to possible pulmonary embolisms, and the risk of death.  I explained to the patient the possibility that this procedure may not be performed laparoscopically and may require being converted to an opened procedure or aborted due to abnormal anatomy. associated with discomfort possible open appendectomy procedure with the patient.  I have addressed the patient's questions and concerns to her satisfaction to obtain their informed consent.      Alexey Cruz MD  08/02/24  19:40 CDT              Electronically signed by Alexey Cruz MD at 08/1945          Emergency Department Notes        Flaquita Franklin RN at 08/02/24 1945          Patient placed in a gown, all clothing in a patient belongings bag.  Patients dentures removed, and placed in denture cup with patient sticker.  Patients 2 rings placed in a denture cup with patient sticker.  Rings on right hand could not be  removed, rings taped over at this time.  All patient belongings given to patients  at bedside.      Electronically signed by Flaquita Franklin RN at 08/02/24 2008       Maria Teresa John APRN at 08/02/24 1700       Attestation signed by Robbin Escobar DO at 08/03/24 3401        SUPERVISE: For this patient encounter, I reviewed the APC's documentation, treatment plan, and medical decision making.  Robbin Escobar DO 8/3/2024 21:59 CDT                         Subjective   History of Present Illness  Patient is a 78-year-old female who presents to the ER with chief complaints of abdominal pain.  She states she began experiencing right lower quadrant abdominal pain yesterday.  She reports nausea with vomiting however denies any diarrhea.  She denies any recorded fevers or dysuria.  She states pain is much worse with walking or particular movements and is described as sharp and stabbing with movement.  Due to symptoms described she came the ER for evaluation and treatment.  Past medical history significant for hypertension        Review of Systems   Constitutional: Negative.  Negative for fever.   HENT: Negative.  Negative for congestion.    Respiratory: Negative.  Negative for cough and shortness of breath.    Cardiovascular: Negative.  Negative for chest pain.   Gastrointestinal:  Positive for abdominal pain, nausea and vomiting. Negative for constipation and diarrhea.   Genitourinary: Negative.  Negative for dysuria.   Musculoskeletal: Negative.  Negative for back pain.   Skin: Negative.    All other systems reviewed and are negative.      Past Medical History:   Diagnosis Date    Hypertension        No Known Allergies    Past Surgical History:   Procedure Laterality Date    HYSTERECTOMY         History reviewed. No pertinent family history.    Social History     Socioeconomic History    Marital status:            Objective   Physical Exam  Vitals and nursing note reviewed.   Constitutional:        Appearance: She is well-developed. She is ill-appearing.   HENT:      Head: Normocephalic and atraumatic.      Nose: Nose normal.   Eyes:      Conjunctiva/sclera: Conjunctivae normal.      Pupils: Pupils are equal, round, and reactive to light.   Cardiovascular:      Rate and Rhythm: Normal rate and regular rhythm.      Heart sounds: Normal heart sounds.   Pulmonary:      Effort: Pulmonary effort is normal.      Breath sounds: Normal breath sounds.   Abdominal:      General: Bowel sounds are normal.      Palpations: Abdomen is soft.      Tenderness: There is abdominal tenderness in the right lower quadrant. There is guarding. There is no right CVA tenderness or rebound.   Musculoskeletal:         General: Normal range of motion.      Cervical back: Normal range of motion and neck supple.   Skin:     General: Skin is warm and dry.      Capillary Refill: Capillary refill takes less than 2 seconds.   Neurological:      Mental Status: She is alert and oriented to person, place, and time.   Psychiatric:         Behavior: Behavior normal.         Procedures          ED Course  ED Course as of 08/02/24 1936   Fri Aug 02, 2024   1903 Discussed with Dr. Cruz who will be coming to the ER to evaluate the patient.   Discussed other findings on CT scan with the patient and  including the liver lesions that would need further evaluation outpatient with their pcp. Patient and  both expressed understanding.  [TW]   1909 Dr. Cruz at bedside to evaluate the patient.  [TW]      ED Course User Index  [TW] Maria Teresa John APRN                                             Medical Decision Making  Patient is a 78-year-old female who presents to the ER with chief complaints of abdominal pain.  She states she began experiencing right lower quadrant abdominal pain yesterday.  She reports nausea with vomiting however denies any diarrhea.  She denies any recorded fevers or dysuria.  She states pain is much worse with  walking or particular movements and is described as sharp and stabbing with movement.  Due to symptoms described she came the ER for evaluation and treatment.  Past medical history significant for hypertension  Differential diagnosis: Appendicitis, UTI, kidney stone, viral illness, and other    Labs Reviewed  COMPREHENSIVE METABOLIC PANEL - Abnormal; Notable for the following components:     Glucose                       184 (*)                Potassium                     3.4 (*)                Chloride                      96 (*)              All other components within normal limits         Narrative: GFR Normal >60                  Chronic Kidney Disease <60                  Kidney Failure <15                                    The GFR formula is only valid for adults with stable renal function between ages 18 and 70.  CBC WITH AUTO DIFFERENTIAL - Abnormal; Notable for the following components:     WBC                           21.56 (*)               Hemoglobin                    11.7 (*)               MPV                           12.1 (*)               Neutrophil %                  86.9 (*)               Lymphocyte %                  4.6 (*)                Eosinophil %                  0.2 (*)                Immature Grans %              0.8 (*)                Neutrophils, Absolute         18.71 (*)               Monocytes, Absolute           1.58 (*)               Immature Grans, Absolute      0.18 (*)            All other components within normal limits  LIPASE - Normal  LACTIC ACID, PLASMA - Normal  URINALYSIS W/ MICROSCOPIC IF INDICATED (NO CULTURE)  CBC AND DIFFERENTIAL     CT Abdomen Pelvis With Contrast   Final Result    1. Acute appendicitis with wall thickening and inflammatory changes in    the adjacent soft tissues. There is an appendicolith. There is some free    fluid in the right lower quadrant and pelvis related to the inflammatory    process. No perforation or abscess is visualized.    2.  Fatty infiltration of the liver. There are scattered liver lesions    that are not fully evaluated on this examination. They are not simple    cyst. The differential includes hemangiomas or other lesions. Metastatic    disease cannot be excluded in a patient of this age. One of the lesions    does demonstrate a focal area of nodular enhancement along the periphery    of the lesion which can be suggestive of hemangioma. Some of the lesions    are very small. Liver mass MRI or CT is recommended to further evaluate    these lesions unless there are prior outside studies.    3. Prior hysterectomy. Atheromatous disease of the aortoiliac vessels.    Degenerative changes of the spine.              The full report of this exam was immediately signed and available to the    emergency room. The patient is currently in the emergency room.         This report was signed and finalized on 8/2/2024 6:42 PM by Dr. Rich Austin MD.         Discussed with Dr. Cruz who will be coming to the ER to evaluate the patient.   Discussed other findings on CT scan with the patient and  including the liver lesions that would need further evaluation outpatient with their pcp. Patient and  both expressed understanding.     Plan is to take patient to the OR - See Dr. Cruz note for details      Problems Addressed:  Acute appendicitis, unspecified acute appendicitis type: acute illness or injury    Amount and/or Complexity of Data Reviewed  Labs: ordered. Decision-making details documented in ED Course.  Radiology: ordered. Decision-making details documented in ED Course.  Discussion of management or test interpretation with external provider(s): Discussed with Dr. Cruz    Risk  Prescription drug management.        Final diagnoses:   Acute appendicitis, unspecified acute appendicitis type       ED Disposition  ED Disposition       ED Disposition   Send to OR    Condition   --    Comment   --               No follow-up provider  specified.       Medication List      No changes were made to your prescriptions during this visit.            Alvaro Maria Teresa Wendy, APRN  08/02/24 1936      Electronically signed by Robbin Escobar DO at 08/03/24 2159       Vital Signs (last day)       Date/Time Temp Temp src Pulse Resp BP Patient Position SpO2    08/05/24 0811 -- -- -- -- -- -- 96    08/05/24 0750 97.7 (36.5) Oral 74 16 160/81 Lying 95    08/05/24 0500 -- -- 76 16 167/82 Lying 94    08/05/24 0442 98.3 (36.8) Oral 79 16 172/83 Lying 93    08/04/24 2330 97.6 (36.4) Oral 77 16 159/79 Lying 93    08/04/24 2245 -- -- 86 -- 166/87 Lying 95    08/04/24 2230 -- -- 86 -- 170/88 Lying 94    08/04/24 2028 97.7 (36.5) Oral 87 16 173/88 Lying 94    08/04/24 1618 96.9 (36.1) Oral 87 18 178/92 Lying 95    08/04/24 1154 97.2 (36.2) Oral 94 18 175/95 Lying 94    08/04/24 0753 97.7 (36.5) Oral 85 18 174/89 Lying 95    08/04/24 0418 99 (37.2) Axillary 84 18 154/73 Lying 92          Current Facility-Administered Medications   Medication Dose Route Frequency Provider Last Rate Last Admin    acetaminophen (TYLENOL) tablet 650 mg  650 mg Oral Q6H Alexey Cruz MD   650 mg at 08/04/24 1321    dexAMETHasone (DECADRON) injection 4 mg  4 mg Intravenous Q8H PRN Alexey Cruz MD   4 mg at 08/04/24 1529    diphenhydrAMINE (BENADRYL) injection 25 mg  25 mg Intravenous Q6H PRN Alexey Cruz MD        HYDROcodone-acetaminophen (NORCO) 5-325 MG per tablet 1 tablet  1 tablet Oral Q6H PRN Alexey Cruz MD        lactated ringers infusion  125 mL/hr Intravenous Continuous Alexey Cruz  mL/hr at 08/05/24 0421 125 mL/hr at 08/05/24 0421    losartan (COZAAR) tablet 25 mg  25 mg Oral Daily Alexey Cruz MD   25 mg at 08/04/24 0822    metoclopramide (REGLAN) injection 5 mg  5 mg Intravenous Q8H Alexey Cruz MD   5 mg at 08/05/24 0421    metroNIDAZOLE (FLAGYL) IVPB 500 mg  500 mg Intravenous Q8H Alexey Cruz  mL/hr at 08/05/24 0110 500 mg  at 08/05/24 0110    naloxone (NARCAN) injection 0.1 mg  0.1 mg Intravenous Q5 Min PRN Alexey Cruz MD        ondansetron (ZOFRAN) injection 4 mg  4 mg Intravenous Q6H PRN Alexey Cruz MD   4 mg at 08/04/24 1102    pantoprazole (PROTONIX) EC tablet 40 mg  40 mg Oral Q AM Alexey Cruz MD   40 mg at 08/03/24 0541    promethazine (PHENERGAN) 12.5 mg in sodium chloride 0.9 % 50 mL  12.5 mg Intravenous Q6H PRN Alexey Cruz MD   12.5 mg at 08/04/24 1317    simethicone (MYLICON) chewable tablet 40 mg  40 mg Oral 4x Daily PRN Alexey Cruz MD        sodium chloride 0.9 % flush 1-10 mL  1-10 mL Intravenous PRN Alexey Cruz MD        sodium chloride 0.9 % flush 10 mL  10 mL Intravenous Q12H Alexey Cruz MD   10 mL at 08/04/24 2041    sodium chloride 0.9 % infusion 40 mL  40 mL Intravenous PRN Alexey Cruz MD        traMADol (ULTRAM) tablet 50 mg  50 mg Oral Q6H PRN Alexey Cruz MD            Operative/Procedure Notes (last 72 hours)        Alexey Cruz MD at 08/02/24 2208          Laparoscopic Appendectomy Possible Open     Preoperative diagnosis: Acute appendicitis    Postoperative diagnosis: as above      Indications: The patient was admitted to the hospital with presentation of an acute appendicitis.         Surgeon: Alexey Cruz MD     Assistants: Tech    Anesthesia: General endotracheal anesthesia    ASA Class: 3          Procedure Details       After the patient was explained the alternatives, benefits, complications, and risks of a laparoscopic appendectomy possible open including the risk of intra-abdominal organ injury such as small bowel and colon, postoperative infection as well as postoperative wound issues such as herniations was explained to the patient informed consent was provided and signed.  The patient was brought to the OR suite after receiving preoperative antibiotics, medications and DVT prophylaxis with SCDs were placed.  The patient was placed under  general anesthesia.  Lawrence catheter was placed under a sterile fashion.  The patient was then prepped and draped in standard fashion.  We performed a surgical timeout.  I proceeded with a Veress needle approach in the left upper/subcostal location.  The Veress needle was used to insufflate the abdominal cavity to a pressure of 15 mmHg.  This was followed by placement of a 5 mm incision which was followed by placement of a 5 mm trocar into the abdominal cavity with camera assist in the periumbilical location.      The Veress needle was identified and removed safely.  A 5 mm incision was made in the left upper quadrant location for placement of a 5 mm trocar under direct visualization.  Entering the abdominal cavity there was significant defect into diffusions in the midline from her previous hysterectomy.  I placed a 12 mm incision for placement of a 5 mm trocar into this location to help facilitate taking down these adhesions utilizing harmonic scalpel.  Once these midline adhesions were taken down I was able to visualize the cecum.  I placed a 5 mm trocar in the suprapubic location after making a 5 mm incision and then I exchanged the left lower quadrant trocar with the 12 mm trocar under direct visualization. The patient was placed in Trendelenburg position and rotated to the patient's left side.  Identified the ascending colon and followed up to the cecum.  Patient's cecum was redundant and the appendix was tethered to the abdominal wall.  Blunt dissection was performed to free the adhesions and suction was utilized to suction away any questionable purulent fluid.  The appendix was torsion and I bluntly dissected to expose the mesoappendix and base of the appendix.  I created a window through the area of the cecum and base of the appendix and fired a blue load stapler across to free the appendix from its cecal attachments.  The appendix base was not as grossly inflamed as the appendix tip.  I noticed mild oozing  at the staple line and I placed a 5 mm clip across the area to control hemostasis..  The mesoappendix was inflamed and mildly edematous and I elevated the appendix to expose the mesoappendix and placed a white load across the mesoappendix appendix and fired to completely remove the appendix from its attachment.  Prior to firing I sure that there was no small bowel involved within the jaws of the stapling device.    The appendix was then removed safely from its attachments and placed in an Endo cinch bag.  It was removed through the large trocar site under direct visualization.  I then assessed the staple line to observe for hemostasis.  Once hemostasis was confirmed I then proceeded with suction of the area to remove any fluid grossly.  The base of the mesoappendix and stump of the appendix/cecal location was hemostatic.  The larger trocar site's fascia was reapproximated using a Randy-Evonne device and an 0 Vicryl suture.     Then re-locally anesthetized skin wounds for closure.  Skin wounds were closed with 4-0 Monocryl.  Prior to closing skin wounds all lap pads and attachments were accounted for at the end of the procedure.      White staples: 1  Blue staples: 1      Findings: Acute appendicitis    Estimated Blood Loss:  minimal           Specimens: Appendix             Implants:   Implant Name Type Inv. Item Serial No.  Lot No. LRB No. Used Action   CLIPAPPLR M/ ENDO LIGAMAX5 5MM 33CM MD/BRENDAN - [id number] Implant CLIPAPPLR M/ ENDO LIGAMAX5 5MM 33CM MD/BRENDAN  [employer]  DIV OF J AND J [id number] N/A 1 Implanted              Complications: None           Disposition: PACU - hemodynamically stable.           Condition: stable     Electronically signed by Alexey Cruz MD at 08/02/24 1556          Physician Progress Notes (last 48 hours)        Alexey Cruz MD at 08/04/24 0816          Patient Care Team:  Jena Shen MD as PCP - General (Family Medicine)    Alton Ramos  "Suiter is POD 2 days ago from laparoscopic appendectomy.  Postoperative ileus presented.  Patient had multiple bouts of vomiting last night.  Positive flatus reported by the patient this a.m.  no bowel movement.  Patient complains of nausea.       Review of Systems:     Review of Systems - General ROS: positive for  - fatigue  Respiratory ROS: no cough, shortness of breath, or wheezing  Cardiovascular ROS: no chest pain or dyspnea on exertion  Gastrointestinal ROS: positive for - abdominal pain, gas/bloating, and nausea/vomiting    Objective     Vital Signs  /89 (BP Location: Right arm, Patient Position: Lying)   Pulse 85   Temp 97.7 °F (36.5 °C) (Oral)   Resp 18   Ht 172.7 cm (68\")   Wt 68.9 kg (151 lb 14.4 oz)   SpO2 95%   BMI 23.10 kg/m²     Physical Exam:  Physical Exam  Constitutional:       General: She is not in acute distress.     Appearance: She is normal weight. She is not ill-appearing.   Cardiovascular:      Rate and Rhythm: Normal rate and regular rhythm.      Pulses: Normal pulses.      Heart sounds: Normal heart sounds.   Abdominal:      General: Bowel sounds are decreased. There is distension.      Tenderness: There is abdominal tenderness. There is no guarding or rebound.          Comments: Wound dressings clean dry and intact.  Tenderness specifically at incision sites appropriately.  Tenderness also right lower quadrant.           Results Review:    Labs reviewed  Lab Results (last 24 hours)       Procedure Component Value Units Date/Time    Comprehensive Metabolic Panel [[id number]]  (Abnormal) Collected: 08/04/24 0557    Specimen: Blood Updated: 08/04/24 0652     Glucose 165 mg/dL      BUN 24 mg/dL      Creatinine 1.01 mg/dL      Sodium 139 mmol/L      Potassium 3.8 mmol/L      Chloride 102 mmol/L      CO2 28.0 mmol/L      Calcium 8.6 mg/dL      Total Protein 6.6 g/dL      Albumin 3.0 g/dL      ALT (SGPT) 8 U/L      AST (SGOT) 16 U/L      Alkaline Phosphatase 90 U/L      Total " Bilirubin 0.4 mg/dL      Globulin 3.6 gm/dL      A/G Ratio 0.8 g/dL      BUN/Creatinine Ratio 23.8     Anion Gap 9.0 mmol/L      eGFR 57.5 mL/min/1.73     Narrative:      GFR Normal >60  Chronic Kidney Disease <60  Kidney Failure <15    The GFR formula is only valid for adults with stable renal function between ages 18 and 70.    CBC (No Diff) [[id number]]  (Abnormal) Collected: 08/04/24 0557    Specimen: Blood Updated: 08/04/24 0631     WBC 14.08 10*3/mm3      RBC 3.87 10*6/mm3      Hemoglobin 11.1 g/dL      Hematocrit 36.8 %      MCV 95.1 fL      MCH 28.7 pg      MCHC 30.2 g/dL      RDW 12.9 %      RDW-SD 45.1 fl      MPV 12.3 fL      Platelets 167 10*3/mm3               Medication Reviewed:   Current Facility-Administered Medications   Medication Dose Route Frequency Provider Last Rate Last Admin    acetaminophen (TYLENOL) tablet 650 mg  650 mg Oral Q6H Alexey Cruz MD   650 mg at 08/03/24 1314    dexAMETHasone (DECADRON) injection 4 mg  4 mg Intravenous Q8H PRN Alexey Cruz MD   4 mg at 08/03/24 2138    diphenhydrAMINE (BENADRYL) injection 25 mg  25 mg Intravenous Q6H PRN Alexey Cruz MD        HYDROcodone-acetaminophen (NORCO) 5-325 MG per tablet 1 tablet  1 tablet Oral Q6H PRN Alexey Cruz MD        labetalol (NORMODYNE,TRANDATE) injection 10 mg  10 mg Intravenous Q30 Min PRN Alexey Cruz MD        lactated ringers infusion  125 mL/hr Intravenous Continuous Alexey Cruz  mL/hr at 08/04/24 0813 125 mL/hr at 08/04/24 0813    losartan (COZAAR) tablet 25 mg  25 mg Oral Daily Alexey Cruz MD        metoclopramide (REGLAN) injection 5 mg  5 mg Intravenous Q8H Alexey Cruz MD   5 mg at 08/04/24 0358    naloxone (NARCAN) injection 0.1 mg  0.1 mg Intravenous Q5 Min PRN Alexey Cruz MD        ondansetron (ZOFRAN) injection 4 mg  4 mg Intravenous Q6H PRN Alexey Cruz MD   4 mg at 08/04/24 0249    pantoprazole (PROTONIX) EC tablet 40 mg  40 mg Oral Q AM Alexey Cruz  MD ARABELLA   40 mg at 08/03/24 0541    promethazine (PHENERGAN) 12.5 mg in sodium chloride 0.9 % 50 mL  12.5 mg Intravenous Q6H PRN Alexey Cruz MD   12.5 mg at 08/04/24 0530    simethicone (MYLICON) chewable tablet 40 mg  40 mg Oral 4x Daily PRN Alexey Cruz MD        sodium chloride 0.9 % flush 1-10 mL  1-10 mL Intravenous PRN Alexey Cruz MD        sodium chloride 0.9 % flush 10 mL  10 mL Intravenous Q12H Alexey Cruz MD   10 mL at 08/03/24 2111    sodium chloride 0.9 % infusion 40 mL  40 mL Intravenous PRN Alexey Cruz MD        traMADol (ULTRAM) tablet 50 mg  50 mg Oral Q6H PRN Alexey Cruz MD           Assessment & Plan  POD 2 days ago from laparoscopic appendectomy.  Currently presents as a postoperative ileus.  NG tube was placed due to symptoms and explained to the patient her findings of a hiatal hernia.  NG tube placement was performed by Dr. Cruz due to the findings on CT of a hiatal hernia.  Patient tolerated procedure.  Approximately 1 L of gastric fluids evacuated.  Will await GI status improved.  Encourage ambulation and utilization of incentive spirometer we will keep n.p.o. with the exceptions of ice chips and sips and sips with meds.  Will repeat labs to follow trends.      Alexey Cruz MD  08/04/24  08:16 CDT      Electronically signed by Alexey Cruz MD at 08/04/24 0822       Alexey Cruz MD at 08/03/24 1240          Patient Care Team:  Jena Shen MD as PCP - General (Family Medicine)    [place]ton     Rachel Suiter is POD 1 day ago from a laparoscopic appendectomy.  Patient's complaints of nausea and vomited x 2 1 last night and 1 this a.m.  She also complains of right hip pain which she noted after receiving her CT scan.  She states it is difficult for her to ambulate since.  Denies flatus or bowel movement.  Abdominal pain has improved.       Review of Systems:     Review of Systems - General ROS: negative  Respiratory ROS: no cough, shortness of  "breath, or wheezing  Cardiovascular ROS: no chest pain or dyspnea on exertion  Gastrointestinal ROS: positive for - abdominal pain and nausea/vomiting  negative for - blood in stools, diarrhea, or swallowing difficulty/pain  Musculoskeletal ROS: positive for - joint pain    Objective     Vital Signs  /56 (BP Location: Right arm, Patient Position: Lying)   Pulse 75   Temp 98.3 °F (36.8 °C) (Oral)   Resp 16   Ht 172.7 cm (68\")   Wt 68.9 kg (151 lb 14.4 oz)   SpO2 90%   BMI 23.10 kg/m²     Physical Exam:  Physical Exam  Constitutional:       Appearance: Normal appearance. She is normal weight.   Cardiovascular:      Rate and Rhythm: Normal rate and regular rhythm.   Pulmonary:      Effort: Pulmonary effort is normal.      Breath sounds: Normal breath sounds.   Abdominal:      General: Bowel sounds are decreased. There is no distension.      Palpations: Abdomen is soft. There is no shifting dullness.      Tenderness: There is abdominal tenderness. There is no guarding or rebound.          Comments: Wound dressing sites are clean dry and intact.  Mild tenderness in the left lower quadrant.   Neurological:      Mental Status: She is alert.           Results Review:      CBC          8/2/2024    16:25 8/3/2024    05:29   CBC   WBC 21.56  13.21    RBC 4.03  3.42    Hemoglobin 11.7  10.0    Hematocrit 37.1  32.6    MCV 92.1  95.3    MCH 29.0  29.2    MCHC 31.5  30.7    RDW 12.6  13.1    Platelets 187  159       CMP          8/2/2024    16:25 8/3/2024    05:29   CMP   Glucose 184  136    BUN 16  19    Creatinine 0.97  1.08    EGFR 60.3  53.0    Sodium 136  139    Potassium 3.4  3.8    Chloride 96  101    Calcium 9.0  8.3    Total Protein 7.0  6.4    Albumin 3.5  3.0    Globulin 3.5  3.4    Total Bilirubin 0.8  0.6    Alkaline Phosphatase 104  96    AST (SGOT) 19  16    ALT (SGPT) 15  11    Albumin/Globulin Ratio 1.0  0.9    BUN/Creatinine Ratio 16.5  17.6    Anion Gap 14.0  10.0             Medication Reviewed: "   Current Facility-Administered Medications   Medication Dose Route Frequency Provider Last Rate Last Admin    acetaminophen (TYLENOL) tablet 650 mg  650 mg Oral Q6H Alexey Cruz MD        dexAMETHasone (DECADRON) injection 4 mg  4 mg Intravenous Q8H PRN Alexey Cruz MD        diphenhydrAMINE (BENADRYL) injection 25 mg  25 mg Intravenous Q6H PRN Alexey Cruz MD        HYDROcodone-acetaminophen (NORCO) 5-325 MG per tablet 1 tablet  1 tablet Oral Q6H PRN Alexey Cruz MD        ketorolac (TORADOL) injection 30 mg  30 mg Intravenous Q8H PRN Alexey Cruz MD        labetalol (NORMODYNE,TRANDATE) injection 10 mg  10 mg Intravenous Q30 Min PRN Alexey Cruz MD        lactated ringers infusion  125 mL/hr Intravenous Continuous Alexey Cruz  mL/hr at 08/03/24 1207 125 mL/hr at 08/03/24 1207    losartan (COZAAR) tablet 25 mg  25 mg Oral Daily Alexey Cruz MD        metoclopramide (REGLAN) injection 5 mg  5 mg Intravenous Q8H Alexey Cruz MD   5 mg at 08/03/24 0412    metroNIDAZOLE (FLAGYL) IVPB 500 mg  500 mg Intravenous Q8H Alexey Cruz MD   Stopped at 08/03/24 1100    naloxone (NARCAN) injection 0.1 mg  0.1 mg Intravenous Q5 Min PRN Alexey Cruz MD        ondansetron (ZOFRAN) injection 4 mg  4 mg Intravenous Q6H PRN Alexey Cruz MD        pantoprazole (PROTONIX) EC tablet 40 mg  40 mg Oral Q AM Alexey Cruz MD   40 mg at 08/03/24 0541    promethazine (PHENERGAN) 12.5 mg in sodium chloride 0.9 % 50 mL  12.5 mg Intravenous Q6H PRN Alexey Cruz MD        simethicone (MYLICON) chewable tablet 40 mg  40 mg Oral 4x Daily PRN Alexey Cruz MD        sodium chloride 0.9 % bolus 500 mL  500 mL Intravenous Once Alexey Cruz MD        sodium chloride 0.9 % flush 1-10 mL  1-10 mL Intravenous PRN Alexey Cruz MD        sodium chloride 0.9 % flush 10 mL  10 mL Intravenous Q12H Alexey Cruz MD   10 mL at 08/02/24 9870    sodium chloride 0.9 %  infusion 40 mL  40 mL Intravenous PRN Alexey Cruz MD        traMADol (ULTRAM) tablet 50 mg  50 mg Oral Q6H PRN Alexey Cruz MD           Assessment & Plan    POD 1 day ago from the above procedure with postoperative ileus.  Encourage incentive spirometer use, ambulate and will await GI function to progress.  Laboratory work is improved with respect to her leukocytosis.  Will complete antibiotic regimen and expected to end today.  Right hip pain I have ordered an x-ray of the pelvis and physical therapy consultation.  Explained plan and expectations with the patient and her .  Addressed their questions or concerns to their satisfaction.      Alexey Cruz MD  08/03/24  12:40 CDT      Electronically signed by Alexey Cruz MD at 08/03/24 1246         PRN MEDS  8/4  Labetalol iv x2     zofran iv x2  phenergan iv x2    8/4  Patient treated multiple times both per MAR and by non-pharmacological means (cool cloth, breathing, etc) for nausea/vomiting control. Patient vomited numerous times overnight. MAR treatment for nausea had little effect except promethazine which worked the best. Patient got little sleep, but did get some sleep between bouts of nausea. Patient had very little intake due to nausea/vomiting. Patient able to ambulate multiple times x1 assist to the bathroom        Signed         Radiology notified of stat imaging to confirm NG placement. Provider placed NG.                 8/5   Patient has NG tube intact and clamped. Patient has had one episode of dry heaves with clear emesis of 50 ml. Patient reports NG tube discomfort and promotes gaging at times. Patient Systolic blood pressure elevated above 170 and nurse gave ordered labatolol 10 mg IV x1 dose and Blood pressure had good response; see flowsheets. Patient resting well with no reports of pain; IVF infusing and medications given as prescribed. Lap sites to abdomen intact with no drainage. Will continue to assess and treat  patient per plan of care.

## 2024-08-05 NOTE — PROGRESS NOTES
"Patient Care Team:  Jena Shen MD as PCP - General (Family Medicine)    Alton Ramos Suiter is POD 3 from her laparoscopic appendectomy.  Postoperative ileus presented.  Patient's ileus appears to have improved with 2 bowel movements yesterday after NG tube placement.  Approximately 350 cc was evacuated.  Patient tolerated NG tube clamping trial last night.  Positive flatus reported by the patient this a.m.  no bowel movement.  Denies increased abdominal pain.       Review of Systems:     Review of Systems - General ROS: positive for  - fatigue  Respiratory ROS: no cough, shortness of breath, or wheezing  Cardiovascular ROS: no chest pain or dyspnea on exertion  Gastrointestinal ROS: positive for - gas/bloating    Objective     Vital Signs  /81 (BP Location: Right arm, Patient Position: Lying)   Pulse 74   Temp 97.7 °F (36.5 °C) (Oral)   Resp 16   Ht 172.7 cm (68\")   Wt 68.9 kg (151 lb 14.4 oz)   SpO2 96%   BMI 23.10 kg/m²     Physical Exam:  Physical Exam  Constitutional:       General: She is not in acute distress.     Appearance: She is normal weight. She is not ill-appearing.   Cardiovascular:      Rate and Rhythm: Normal rate and regular rhythm.      Pulses: Normal pulses.      Heart sounds: Normal heart sounds.   Abdominal:      General: A surgical scar is present. Bowel sounds are increased. There is distension.      Tenderness: There is no abdominal tenderness. There is no guarding or rebound.          Comments: Wound dressings clean dry and intact.  Tenderness specifically at incision sites appropriately.               Results Review:    Labs reviewed  CBC          8/3/2024    05:29 8/4/2024    05:57 8/5/2024    05:08   CBC   WBC 13.21  14.08  13.20    RBC 3.42  3.87  3.66    Hemoglobin 10.0  11.1  10.7    Hematocrit 32.6  36.8  34.0    MCV 95.3  95.1  92.9    MCH 29.2  28.7  29.2    MCHC 30.7  30.2  31.5    RDW 13.1  12.9  12.9    Platelets 159  167  205             Medication " Reviewed:   Current Facility-Administered Medications   Medication Dose Route Frequency Provider Last Rate Last Admin    acetaminophen (TYLENOL) tablet 650 mg  650 mg Oral Q6H Alexey Cruz MD   650 mg at 08/04/24 1321    dexAMETHasone (DECADRON) injection 4 mg  4 mg Intravenous Q8H PRN Alexey Cruz MD   4 mg at 08/04/24 1529    diphenhydrAMINE (BENADRYL) injection 25 mg  25 mg Intravenous Q6H PRN Alexey Cruz MD        HYDROcodone-acetaminophen (NORCO) 5-325 MG per tablet 1 tablet  1 tablet Oral Q6H PRN Alexey Cruz MD        lactated ringers infusion  125 mL/hr Intravenous Continuous Alexey Cruz  mL/hr at 08/05/24 0421 125 mL/hr at 08/05/24 0421    losartan (COZAAR) tablet 25 mg  25 mg Oral Daily Alexey Cruz MD   25 mg at 08/04/24 0822    metoclopramide (REGLAN) injection 5 mg  5 mg Intravenous Q8H Alexey Cruz MD   5 mg at 08/05/24 0421    metroNIDAZOLE (FLAGYL) IVPB 500 mg  500 mg Intravenous Q8H Alexey Cruz  mL/hr at 08/05/24 0110 500 mg at 08/05/24 0110    naloxone (NARCAN) injection 0.1 mg  0.1 mg Intravenous Q5 Min PRN Alexey Cruz MD        ondansetron (ZOFRAN) injection 4 mg  4 mg Intravenous Q6H PRN Alexey Cruz MD   4 mg at 08/04/24 1102    pantoprazole (PROTONIX) EC tablet 40 mg  40 mg Oral Q AM Alexey Cruz MD   40 mg at 08/03/24 0541    promethazine (PHENERGAN) 12.5 mg in sodium chloride 0.9 % 50 mL  12.5 mg Intravenous Q6H PRN Alexey Cruz MD   12.5 mg at 08/04/24 1317    simethicone (MYLICON) chewable tablet 40 mg  40 mg Oral 4x Daily PRN Alexey Cruz MD        sodium chloride 0.9 % flush 1-10 mL  1-10 mL Intravenous PRN Alexey Cruz MD        sodium chloride 0.9 % flush 10 mL  10 mL Intravenous Q12H Alexey Cruz MD   10 mL at 08/04/24 2041    sodium chloride 0.9 % infusion 40 mL  40 mL Intravenous PRN Alexey Cruz MD        traMADol (ULTRAM) tablet 50 mg  50 mg Oral Q6H PRN Alexey Cruz MD            Assessment & Plan  POD 3 from laparoscopic appendectomy.  With a postoperative ileus.  Not unlikely due to the infection found intraoperatively.  NG tube was placed to clamp status while patient was taking ice chips and sips and tolerated this well last night.  Will check residuals and more likely remove NG tube.  Reviewed abdominal x-rays which shows dilatation of the small bowel however on examination and discussion with the patient it appears her GI tract is improving.  Patient was made aware that if nausea and vomiting returns there is a likelihood of placement of NG tube.  I also encouraged continued ambulation and incentive spirometer use.      Alexey Cruz MD  08/05/24  08:48 CDT

## 2024-08-05 NOTE — THERAPY TREATMENT NOTE
Patient Name: Rachel Zaragoza  : 1945    MRN: [id number]                              Today's Date: 2024       Admit Date: 2024    Visit Dx:     ICD-10-CM ICD-9-CM   1. Acute appendicitis with localized peritonitis, without perforation, abscess, or gangrene  K35.30 540.9   2. Acute appendicitis, unspecified acute appendicitis type  K35.80 540.9   3. Appendicitis, acute  K35.80 540.9   4. Impaired mobility [Z74.09]  Z74.09 799.89     Patient Active Problem List   Diagnosis    Acute appendicitis with localized peritonitis, without perforation, abscess, or gangrene    Acute appendicitis    Status post laparoscopic appendectomy     Past Medical History:   Diagnosis Date    Hypertension      Past Surgical History:   Procedure Laterality Date    APPENDECTOMY N/A 2024    Procedure: APPENDECTOMY LAPAROSCOPIC;  Surgeon: Alexey Cruz MD;  Location: [facility];  Service: General;  Laterality: N/A;    HYSTERECTOMY        General Information       Row Name 24 1024          Physical Therapy Time and Intention    Document Type therapy note (daily note)  -SB     Mode of Treatment physical therapy  -SB       Row Name 24 1024          General Information    Patient Profile Reviewed yes  -SB     Existing Precautions/Restrictions fall  -SB       Row Name 24 1024          Cognition    Orientation Status (Cognition) oriented x 4  -SB       Row Name 24 1024          Safety Issues, Functional Mobility    Impairments Affecting Function (Mobility) endurance/activity tolerance;balance  -SB               User Key  (r) = Recorded By, (t) = Taken By, (c) = Cosigned By      Initials Name Provider Type    SB Fely Ferguson PT DPT Physical Therapist                   Mobility       Row Name 24 1024          Bed Mobility    Bed Mobility supine-sit  -SB     Supine-Sit Platter (Bed Mobility) standby assist;verbal cues  -SB     Assistive Device (Bed Mobility) head of bed elevated  -SB       Row  Name 08/05/24 1024          Sit-Stand Transfer    Sit-Stand Falls (Transfers) standby assist  -SB       Row Name 08/05/24 1024          Gait/Stairs (Locomotion)    Falls Level (Gait) contact guard;verbal cues  -SB     Patient was able to Ambulate yes  -SB     Distance in Feet (Gait) 220  -SB     Deviations/Abnormal Patterns (Gait) gait speed decreased  -SB     Comment, (Gait/Stairs) min LOB when up in hallway that was self-corrected  -SB               User Key  (r) = Recorded By, (t) = Taken By, (c) = Cosigned By      Initials Name Provider Type    Fely Donovan PT DPT Physical Therapist                   Obj/Interventions       Row Name 08/05/24 1024          Balance    Balance Assessment sitting static balance;sitting dynamic balance;standing static balance;standing dynamic balance  -SB     Static Sitting Balance supervision  -SB     Dynamic Sitting Balance supervision  -SB     Position, Sitting Balance sitting edge of bed;unsupported  -SB     Static Standing Balance standby assist  -SB     Dynamic Standing Balance contact guard  -SB     Position/Device Used, Standing Balance unsupported  -SB               User Key  (r) = Recorded By, (t) = Taken By, (c) = Cosigned By      Initials Name Provider Type    Fely Donovan PT DPT Physical Therapist                   Goals/Plan    No documentation.                  Clinical Impression       Row Name 08/05/24 1024          Pain    Pretreatment Pain Rating 0/10 - no pain  -SB     Posttreatment Pain Rating 0/10 - no pain  -SB     Pain Intervention(s) Medication (See MAR);Repositioned;Ambulation/increased activity  -SB     Additional Documentation Pain Scale: Numbers Pre/Post-Treatment (Group)  -SB       Row Name 08/05/24 1024          Plan of Care Review    Plan of Care Reviewed With patient  -SB     Progress no change  -SB     Outcome Evaluation PT tx completed. Pt alert and oriented x4 with no complaints. Pt performs bed mob and t/fs with SBA and  gait training with CGA for 220 feet. Pt with one LOB during gait that was self-corrected. 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 Pt did have onset of fatigue with gait but no pain or other complaints. Pt is making great improvements and will cont to benefit from PT.  -SB       Row Name 08/05/24 1024          Therapy Assessment/Plan (PT)    Patient/Family Therapy Goals Statement (PT) go home  -SB       Row Name 08/05/24 1024          Vital Signs    Pre SpO2 (%) 90  -SB     O2 Delivery Pre Treatment room air  -SB       Row Name 08/05/24 1024          Positioning and Restraints    Pre-Treatment Position in bed  -SB     Post Treatment Position chair  -SB     In Chair reclined;call light within reach;encouraged to call for assist  -SB               User Key  (r) = Recorded By, (t) = Taken By, (c) = Cosigned By      Initials Name Provider Type    Fely Donovan, PT DPT Physical Therapist                   Outcome Measures       Row Name 08/05/24 1024 08/05/24 0850       How much help from another person do you currently need...    Turning from your back to your side while in flat bed without using bedrails? 4  -SB 4 (P)   -LO    Moving from lying on back to sitting on the side of a flat bed without bedrails? 4  -SB 3 (P)   -LO    Moving to and from a bed to a chair (including a wheelchair)? 4  -SB 3 (P)   -LO    Standing up from a chair using your arms (e.g., wheelchair, bedside chair)? 4  -SB 3 (P)   -LO    Climbing 3-5 steps with a railing? 3  -SB 3 (P)   -LO    To walk in hospital room? 3  -SB 3 (P)   -LO    AM-PAC 6 Clicks Score (PT) 22  -SB 19 (P)   -LO    Highest Level of Mobility Goal 7 --> Walk 25 feet or more  -SB 6 --> Walk 10 steps or more (P)   -LO      Row Name 08/05/24 1024          Functional Assessment    Outcome Measure Options AM-PAC 6 Clicks Basic Mobility (PT)  -SB               User Key  (r) = Recorded By, (t) = Taken By, (c) = Cosigned By       Initials Name Provider Type    SB Fely Ferguson, PT DPT Physical Therapist    Fabio Bob, Nursing Student Nursing Student                                 Physical Therapy Education       Title: PT OT SLP Therapies (Done)       Topic: Physical Therapy (Done)       Point: Mobility training (Done)       Learning Progress Summary             Patient Acceptance, E, VU by SB at 8/5/2024 1028    Comment: pt edu on progress, goals    Acceptance, D,E, VU,DU by  at 8/4/2024 1057    Comment: benefits of PT and POC, call for A OOB   Family Acceptance, E, VU by SB at 8/5/2024 1028    Comment: pt edu on progress, goals                         Point: Home exercise program (Done)       Learning Progress Summary             Patient Acceptance, E, VU by SB at 8/5/2024 1028    Comment: pt edu on progress, goals   Family Acceptance, E, VU by  at 8/5/2024 1028    Comment: pt edu on progress, goals                         Point: Body mechanics (Done)       Learning Progress Summary             Patient Acceptance, E, VU by SB at 8/5/2024 1028    Comment: pt edu on progress, goals   Family Acceptance, E, VU by  at 8/5/2024 1028    Comment: pt edu on progress, goals                         Point: Precautions (Done)       Learning Progress Summary             Patient Acceptance, E, VU by  at 8/5/2024 1028    Comment: pt edu on progress, goals    Acceptance, D,E, VU,DU by  at 8/4/2024 1057    Comment: benefits of PT and POC, call for A OOB   Family Acceptance, E, VU by  at 8/5/2024 1028    Comment: pt edu on progress, goals                                         User Key       Initials Effective Dates Name Provider Type Discipline     02/03/23 -  Hoang Tom, PT Physical Therapist PT     07/11/23 -  Fely Ferguson PT DPT Physical Therapist PT                  PT Recommendation and Plan     Plan of Care Reviewed With: patient  Progress: no change  Outcome Evaluation: PT tx completed. Pt alert and oriented x4 with  no complaints. Pt performs bed mob and t/fs with SBA and gait training with CGA for 220 feet. Pt with one LOB during gait that was self-corrected. 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 Pt did have onset of fatigue with gait but no pain or other complaints. Pt is making great improvements and will cont to benefit from PT.     Time Calculation:         PT Charges       Row Name 08/05/24 1050             Time Calculation    Start Time 1024  -SB      Stop Time 1048  -SB      Time Calculation (min) 24 min  -SB      PT Received On 08/05/24  -SB         Time Calculation- PT    Total Timed Code Minutes- PT 24 minute(s)  -SB         Timed Charges    26633 - Gait Training Minutes  24  -SB         Total Minutes    Timed Charges Total Minutes 24  -SB       Total Minutes 24  -SB                User Key  (r) = Recorded By, (t) = Taken By, (c) = Cosigned By      Initials Name Provider Type    SB Fely Ferguson, PT DPT Physical Therapist                  Therapy Charges for Today       Code Description Service Date Service Provider Modifiers Qty    [id number] HC GAIT TRAINING EA 15 MIN 8/5/2024 Fely Ferguson PT DPT GP 2            PT G-Codes  Outcome Measure Options: AM-PAC 6 Clicks Basic Mobility (PT)  AM-PAC 6 Clicks Score (PT): 22  PT Discharge Summary  Anticipated Discharge Disposition (PT): home    Fely Ferguson PT DPT  8/5/2024

## 2024-08-05 NOTE — PLAN OF CARE
Goal Outcome Evaluation:  Plan of Care Reviewed With: patient        Progress: no change  Outcome Evaluation: PT tx completed. Pt alert and oriented x4 with no complaints. Pt performs bed mob and t/fs with SBA and gait training with CGA for 220 feet. Pt with one LOB during gait that was self-corrected. 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 Pt did have onset of fatigue with gait but no pain or other complaints. Pt is making great improvements and will cont to benefit from PT.      Anticipated Discharge Disposition (PT): home

## 2024-08-05 NOTE — PLAN OF CARE
Goal Outcome Evaluation:  Plan of Care Reviewed With: patient, family        Progress: no change  Outcome Evaluation: Patient received alert and orientated with spouse at the bedside.  Patient has NG tube intact and clamped.  Patient has had one episode of dry heaves with clear emesis of 50 ml.  Patient reports NG tube discomfort and promotes gaging at times.  Patient Systolic blood pressure elevated above 170 and nurse gave ordered labatolol 10 mg IV x1 dose and Blood pressure had good response; see flowsheets.  Patient resting well with no reports of pain; IVF infusing and medications given as prescribed.  Lap sites to abdomen intact with no drainage.  Will continue to assess and treat patient per plan of care.

## 2024-08-05 NOTE — ANESTHESIA POSTPROCEDURE EVALUATION
"Patient: Rachel Zaragoza    Procedure Summary       Date: 08/02/24 Room / Location:  [facility] /  [facility]    Anesthesia Start: 2015 Anesthesia Stop: 2215    Procedure: APPENDECTOMY LAPAROSCOPIC (Abdomen) Diagnosis:     Surgeons: Alexey Cruz MD Provider: Adithya Cortez CRNA    Anesthesia Type: general ASA Status: 2 - Emergent            Anesthesia Type: general    Vitals  Vitals Value Taken Time   /56 08/02/24 2257   Temp 97.8 °F (36.6 °C) 08/02/24 2255   Pulse 74 08/02/24 2305   Resp 16 08/02/24 2305   SpO2 94 % 08/02/24 2305   Vitals shown include unfiled device data.        Post Anesthesia Care and Evaluation    Patient location during evaluation: PACU  Patient participation: complete - patient participated  Level of consciousness: awake and alert  Pain management: adequate    Airway patency: patent  Anesthetic complications: No anesthetic complications    Cardiovascular status: acceptable  Respiratory status: acceptable  Hydration status: acceptable    Comments: Blood pressure 167/82, pulse 76, temperature 98.3 °F (36.8 °C), temperature source Oral, resp. rate 16, height 172.7 cm (68\"), weight 68.9 kg (151 lb 14.4 oz), SpO2 94%.    Pt discharged from PACU based on kayli score >8    "

## 2024-08-05 NOTE — CASE MANAGEMENT/SOCIAL WORK
Discharge Planning Assessment  [facility]     Patient Name: Rachel Zaragoza  MRN: [id number]  Today's Date: 8/5/2024    Admit Date: 8/2/2024        Discharge Needs Assessment       Row Name 08/05/24 1052       Living Environment    People in Home spouse    Name(s) of People in Home RENETTA ZARAGOZA (Spouse)  [phone] (Mobile)    Current Living Arrangements home    Potentially Unsafe Housing Conditions none    In the past 12 months has the electric, gas, oil, or water company threatened to shut off services in your home? No    Primary Care Provided by self    Provides Primary Care For no one    Family Caregiver if Needed spouse    Quality of Family Relationships helpful;involved;supportive    Able to Return to Prior Arrangements yes       Resource/Environmental Concerns    Resource/Environmental Concerns none    Transportation Concerns none       Transportation Needs    In the past 12 months, has lack of transportation kept you from meetings, work, or from getting things needed for daily living? No       Food Insecurity    Within the past 12 months, you worried that your food would run out before you got the money to buy more. Never true    Within the past 12 months, the food you bought just didn't last and you didn't have money to get more. Never true       Transition Planning    Patient/Family Anticipates Transition to home with family    Patient/Family Anticipated Services at Transition none    Transportation Anticipated family or friend will provide       Discharge Needs Assessment    Equipment Currently Used at Home none    Concerns to be Addressed denies needs/concerns at this time;no discharge needs identified    Anticipated Changes Related to Illness none    Equipment Needed After Discharge none    Discharge Coordination/Progress Patient lives independently at home with .  States she is very active in [employer], helps  softball, substitute teaches, and loves yardwork.    No DME in use.  Has an  established PCP and MCR coverage. No noted needs and she denies having any.  CM/SS available to assist with any needs.                   Discharge Plan    No documentation.                 Continued Care and Services - Admitted Since 8/2/2024    No active coordination exists for this encounter.          Demographic Summary    No documentation.                  Functional Status    No documentation.                  Psychosocial    No documentation.                  Abuse/Neglect    No documentation.                  Legal    No documentation.                  Substance Abuse    No documentation.                  Patient Forms    No documentation.                     Ruchi Griffith RN

## 2024-08-06 LAB
ALBUMIN SERPL-MCNC: 2.6 G/DL (ref 3.5–5.2)
ALBUMIN/GLOB SERPL: 0.7 G/DL
ALP SERPL-CCNC: 79 U/L (ref 39–117)
ALT SERPL W P-5'-P-CCNC: 6 U/L (ref 1–33)
ANION GAP SERPL CALCULATED.3IONS-SCNC: 9 MMOL/L (ref 5–15)
AST SERPL-CCNC: 14 U/L (ref 1–32)
BILIRUB SERPL-MCNC: 0.2 MG/DL (ref 0–1.2)
BUN SERPL-MCNC: 24 MG/DL (ref 8–23)
BUN/CREAT SERPL: 34.3 (ref 7–25)
CALCIUM SPEC-SCNC: 8.1 MG/DL (ref 8.6–10.5)
CHLORIDE SERPL-SCNC: 106 MMOL/L (ref 98–107)
CO2 SERPL-SCNC: 27 MMOL/L (ref 22–29)
CREAT SERPL-MCNC: 0.7 MG/DL (ref 0.57–1)
DEPRECATED RDW RBC AUTO: 45 FL (ref 37–54)
EGFRCR SERPLBLD CKD-EPI 2021: 89.2 ML/MIN/1.73
ERYTHROCYTE [DISTWIDTH] IN BLOOD BY AUTOMATED COUNT: 13 % (ref 12.3–15.4)
GLOBULIN UR ELPH-MCNC: 3.6 GM/DL
GLUCOSE SERPL-MCNC: 116 MG/DL (ref 65–99)
HCT VFR BLD AUTO: 33.1 % (ref 34–46.6)
HGB BLD-MCNC: 10.1 G/DL (ref 12–15.9)
MCH RBC QN AUTO: 28.7 PG (ref 26.6–33)
MCHC RBC AUTO-ENTMCNC: 30.5 G/DL (ref 31.5–35.7)
MCV RBC AUTO: 94 FL (ref 79–97)
PLATELET # BLD AUTO: 224 10*3/MM3 (ref 140–450)
PMV BLD AUTO: 11.5 FL (ref 6–12)
POTASSIUM SERPL-SCNC: 3.1 MMOL/L (ref 3.5–5.2)
PROT SERPL-MCNC: 6.2 G/DL (ref 6–8.5)
RBC # BLD AUTO: 3.52 10*6/MM3 (ref 3.77–5.28)
SODIUM SERPL-SCNC: 142 MMOL/L (ref 136–145)
WBC NRBC COR # BLD AUTO: 12.33 10*3/MM3 (ref 3.4–10.8)

## 2024-08-06 PROCEDURE — 25010000002 METRONIDAZOLE 500 MG/100ML SOLUTION: Performed by: SURGERY

## 2024-08-06 PROCEDURE — 25010000002 METOCLOPRAMIDE PER 10 MG: Performed by: SURGERY

## 2024-08-06 PROCEDURE — 25010000002 HYDRALAZINE PER 20 MG: Performed by: SURGERY

## 2024-08-06 PROCEDURE — 25810000003 LACTATED RINGERS PER 1000 ML: Performed by: SURGERY

## 2024-08-06 PROCEDURE — 92610 EVALUATE SWALLOWING FUNCTION: CPT

## 2024-08-06 PROCEDURE — 97530 THERAPEUTIC ACTIVITIES: CPT

## 2024-08-06 PROCEDURE — 85027 COMPLETE CBC AUTOMATED: CPT | Performed by: SURGERY

## 2024-08-06 PROCEDURE — 80053 COMPREHEN METABOLIC PANEL: CPT | Performed by: SURGERY

## 2024-08-06 PROCEDURE — 97116 GAIT TRAINING THERAPY: CPT

## 2024-08-06 RX ORDER — METOPROLOL TARTRATE 1 MG/ML
5 INJECTION, SOLUTION INTRAVENOUS EVERY 6 HOURS PRN
Status: DISCONTINUED | OUTPATIENT
Start: 2024-08-06 | End: 2024-08-09 | Stop reason: HOSPADM

## 2024-08-06 RX ORDER — POTASSIUM CHLORIDE 750 MG/1
20 CAPSULE, EXTENDED RELEASE ORAL ONCE
Status: COMPLETED | OUTPATIENT
Start: 2024-08-06 | End: 2024-08-06

## 2024-08-06 RX ORDER — HYDRALAZINE HYDROCHLORIDE 20 MG/ML
10 INJECTION INTRAMUSCULAR; INTRAVENOUS EVERY 4 HOURS PRN
Status: DISCONTINUED | OUTPATIENT
Start: 2024-08-06 | End: 2024-08-09 | Stop reason: HOSPADM

## 2024-08-06 RX ADMIN — Medication 10 ML: at 10:02

## 2024-08-06 RX ADMIN — HYDRALAZINE HYDROCHLORIDE 10 MG: 20 INJECTION INTRAMUSCULAR; INTRAVENOUS at 20:51

## 2024-08-06 RX ADMIN — METRONIDAZOLE 500 MG: 500 INJECTION, SOLUTION INTRAVENOUS at 16:52

## 2024-08-06 RX ADMIN — Medication 10 ML: at 20:03

## 2024-08-06 RX ADMIN — METOCLOPRAMIDE HYDROCHLORIDE 5 MG: 5 INJECTION INTRAMUSCULAR; INTRAVENOUS at 05:09

## 2024-08-06 RX ADMIN — SODIUM CHLORIDE, POTASSIUM CHLORIDE, SODIUM LACTATE AND CALCIUM CHLORIDE 75 ML/HR: 600; 310; 30; 20 INJECTION, SOLUTION INTRAVENOUS at 00:42

## 2024-08-06 RX ADMIN — METRONIDAZOLE 500 MG: 500 INJECTION, SOLUTION INTRAVENOUS at 00:42

## 2024-08-06 RX ADMIN — METOCLOPRAMIDE HYDROCHLORIDE 5 MG: 5 INJECTION INTRAMUSCULAR; INTRAVENOUS at 20:01

## 2024-08-06 RX ADMIN — METOCLOPRAMIDE HYDROCHLORIDE 5 MG: 5 INJECTION INTRAMUSCULAR; INTRAVENOUS at 13:02

## 2024-08-06 RX ADMIN — METRONIDAZOLE 500 MG: 500 INJECTION, SOLUTION INTRAVENOUS at 08:58

## 2024-08-06 RX ADMIN — POTASSIUM CHLORIDE 20 MEQ: 750 CAPSULE, EXTENDED RELEASE ORAL at 14:46

## 2024-08-06 RX ADMIN — POTASSIUM & SODIUM PHOSPHATES POWDER PACK 280-160-250 MG 1 PACKET: 280-160-250 PACK at 20:01

## 2024-08-06 RX ADMIN — HYDRALAZINE HYDROCHLORIDE 10 MG: 20 INJECTION INTRAMUSCULAR; INTRAVENOUS at 13:35

## 2024-08-06 RX ADMIN — LOSARTAN POTASSIUM 25 MG: 50 TABLET, FILM COATED ORAL at 08:58

## 2024-08-06 RX ADMIN — METOPROLOL TARTRATE 5 MG: 5 INJECTION INTRAVENOUS at 10:24

## 2024-08-06 NOTE — THERAPY EVALUATION
Acute Care - Speech Language Pathology   Swallow Initial Evaluation  [place]     Patient Name: Rachel Suiter  : 1945  MRN: [id number]  Today's Date: 2024               Admit Date: 2024    SPEECH-LANGUAGE PATHOLOGY EVALUATION - SWALLOW  Subjective: The patient was seen on this date for a Clinical Swallow evaluation.  Patient was alert and cooperative.  Significant history: Laparoscopic appendectomy, post-op ileus improved, report of pt having difficulty with pills.   Objective: Textures given included thin liquid and regular consistency.  Assessment: Difficulties were noted with none of the above consistencies.  Observations:  Pt became nauseated when the HOB was elevated. She did not want to place her dentures stating she choked/gagged when she tried to place them earlier today. She reports she did choke on a pill earlier when taking it with water, but does not feel it is a persistent issue. No overt s/s of aspiration were observed during assessment. She had decreased rotary chew of the regular solid, but didn't have her dentures in.   SLP Findings:  Patient presents with functional  oral phase , without esophageal component, rule out pharyngeal dysphagia.   Recommendations: Diet Textures: thin liquid, regular consistency food.  Medications should be taken whole with thin liquids or with applesauce if she has continue difficulty.   Recommended Strategies: Upright for PO, small bites and sips, and alternate liquids and solids. Oral care before breakfast, after all meals and PRN.  Dysphagia therapy is recommended to monitor diet tolerance.   Sofia Pearce, CCC-SLP 2024 16:10 CDT     Visit Dx:     ICD-10-CM ICD-9-CM   1. Acute appendicitis with localized peritonitis, without perforation, abscess, or gangrene  K35.30 540.9   2. Acute appendicitis, unspecified acute appendicitis type  K35.80 540.9   3. Appendicitis, acute  K35.80 540.9   4. Impaired mobility [Z74.09]  Z74.09 799.89   5.  Dysphagia, unspecified type  R13.10 787.20     Patient Active Problem List   Diagnosis    Acute appendicitis with localized peritonitis, without perforation, abscess, or gangrene    Acute appendicitis    Status post laparoscopic appendectomy     Past Medical History:   Diagnosis Date    Hypertension      Past Surgical History:   Procedure Laterality Date    APPENDECTOMY N/A 8/2/2024    Procedure: APPENDECTOMY LAPAROSCOPIC;  Surgeon: Alexey Cruz MD;  Location: [facility];  Service: General;  Laterality: N/A;    HYSTERECTOMY         SLP Recommendation and Plan  SLP Swallowing Diagnosis: functional oral phase, R/O pharyngeal dysphagia (08/06/24 1522)  SLP Diet Recommendation: regular textures, thin liquids (08/06/24 1522)  Recommended Precautions and Strategies: upright posture during/after eating, small bites of food and sips of liquid, alternate between small bites of food and sips of liquid, general aspiration precautions (08/06/24 1522)  SLP Rec. for Method of Medication Administration: meds whole, with thin liquids, with puree (08/06/24 1522)     Monitor for Signs of Aspiration: yes, cough, gurgly voice, throat clearing, notify SLP if any concerns (08/06/24 1522)     Swallow Criteria for Skilled Therapeutic Interventions Met: demonstrates skilled criteria (08/06/24 1522)  Anticipated Discharge Disposition (SLP): unknown (08/06/24 1522)  Rehab Potential/Prognosis, Swallowing: adequate, monitor progress closely (08/06/24 1522)  Therapy Frequency (Swallow): PRN (08/06/24 1522)  Predicted Duration Therapy Intervention (Days): 1 week (08/06/24 1522)  Oral Care Recommendations: Oral Care BID/PRN (08/06/24 1522)                                        Plan of Care Reviewed With: patient, spouse  Outcome Evaluation: SEe note      SWALLOW EVALUATION (Last 72 Hours)       SLP Adult Swallow Evaluation       Row Name 08/06/24 1522                   Rehab Evaluation    Document Type evaluation  -MB        Subjective  Information complains of;nausea/vomiting  -MB        Patient Observations alert;cooperative  -MB        Patient/Family/Caregiver Comments/Observations  presented  -MB           General Information    Patient Profile Reviewed yes  -MB        Pertinent History Of Current Problem Laparoscopic appendectomy, post-op ileus improved, report of pt having difficulty with pills.  -MB        Current Method of Nutrition regular textures;thin liquids  -MB        Precautions/Limitations, Vision WFL  -MB        Precautions/Limitations, Hearing WFL  -MB        Prior Level of Function-Communication WFL  -MB        Prior Level of Function-Swallowing no diet consistency restrictions  -MB        Plans/Goals Discussed with patient;spouse/S.O.  -MB        Barriers to Rehab medically complex  -MB        Patient's Goals for Discharge patient did not state  -MB        Family Goals for Discharge family did not state  -MB           Pain    Additional Documentation Pain Scale: FACES Pre/Post-Treatment (Group)  -MB           Pain Scale: FACES Pre/Post-Treatment    Pain: FACES Scale, Pretreatment 0-->no hurt  -MB           Oral Motor Structure and Function    Dentition Assessment edentulous;other (see comments)  dentures out during eval as pt reports she chooked/gagged when she tried to place them earlier today  -MB        Secretion Management WNL/WFL  -MB        Mucosal Quality moist, healthy  -MB           General Eating/Swallowing Observations    Respiratory Support Currently in Use room air  -MB        Eating/Swallowing Skills self-fed  -MB        Positioning During Eating semi-reclined  -MB        Utensils Used straw  -MB           Clinical Swallow Eval    Oral Prep Phase WFL  -MB        Oral Transit WFL  -MB        Oral Residue WFL  -MB        Pharyngeal Phase no overt signs/symptoms of pharyngeal impairment  -MB        Esophageal Phase unremarkable  -MB        Clinical Swallow Evaluation Summary See note  -MB           SLP  Evaluation Clinical Impression    SLP Swallowing Diagnosis functional oral phase;R/O pharyngeal dysphagia  -MB        Functional Impact risk of malnutrition;risk of dehydration  -MB        Rehab Potential/Prognosis, Swallowing adequate, monitor progress closely  -MB        Swallow Criteria for Skilled Therapeutic Interventions Met demonstrates skilled criteria  -MB           Recommendations    Therapy Frequency (Swallow) PRN  -MB        Predicted Duration Therapy Intervention (Days) 1 week  -MB        SLP Diet Recommendation regular textures;thin liquids  -MB        Recommended Precautions and Strategies upright posture during/after eating;small bites of food and sips of liquid;alternate between small bites of food and sips of liquid;general aspiration precautions  -MB        Oral Care Recommendations Oral Care BID/PRN  -MB        SLP Rec. for Method of Medication Administration meds whole;with thin liquids;with puree  -MB        Monitor for Signs of Aspiration yes;cough;gurgly voice;throat clearing;notify SLP if any concerns  -MB        Anticipated Discharge Disposition (SLP) unknown  -MB           Swallow Goals (SLP)    Swallow LTGs Swallow Long Term Goal (free text)  -MB        Swallow STGs diet tolerance goal selection (SLP)  -MB        Diet Tolerance Goal Selection (SLP) Patient will tolerate trials of  -MB           (LTG) Swallow    (LTG) Swallow Pt will tolerate least restrictive diet with no overt s/s of aspiration.  -MB        Boundary (Swallow Long Term Goal) independently (over 90% accuracy)  -MB        Time Frame (Swallow Long Term Goal) 1 week  -MB        Barriers (Swallow Long Term Goal) n/a  -MB        Progress/Outcomes (Swallow Long Term Goal) new goal  -MB        Comment (Swallow Long Term Goal) n/a  -MB           (STG) Patient will tolerate trials of    Consistencies Trialed (Tolerate trials) regular textures;thin liquids  -MB        Desired Outcome (Tolerate trials) without signs/symptoms of  aspiration;with adequate oral prep/transit/clearance  -MB        Smyth (Tolerate trials) independently (over 90% accuracy)  -MB        Time Frame (Tolerate trials) 1 week  -MB        Progress/Outcomes (Tolerate trials) new goal  -MB        Comment (Tolerate trials) n/a  -MB                  User Key  (r) = Recorded By, (t) = Taken By, (c) = Cosigned By      Initials Name Effective Dates    Sofia Looney CCC-SLP 02/03/23 -                     EDUCATION  The patient has been educated in the following areas:   Dysphagia (Swallowing Impairment).        SLP GOALS       Row Name 08/06/24 1522             (LTG) Swallow    (LTG) Swallow Pt will tolerate least restrictive diet with no overt s/s of aspiration.  -MB      Smyth (Swallow Long Term Goal) independently (over 90% accuracy)  -MB      Time Frame (Swallow Long Term Goal) 1 week  -MB      Barriers (Swallow Long Term Goal) n/a  -MB      Progress/Outcomes (Swallow Long Term Goal) new goal  -MB      Comment (Swallow Long Term Goal) n/a  -MB         (STG) Patient will tolerate trials of    Consistencies Trialed (Tolerate trials) regular textures;thin liquids  -MB      Desired Outcome (Tolerate trials) without signs/symptoms of aspiration;with adequate oral prep/transit/clearance  -MB      Smyth (Tolerate trials) independently (over 90% accuracy)  -MB      Time Frame (Tolerate trials) 1 week  -MB      Progress/Outcomes (Tolerate trials) new goal  -MB      Comment (Tolerate trials) n/a  -MB                User Key  (r) = Recorded By, (t) = Taken By, (c) = Cosigned By      Initials Name Provider Type    Sofia Looney CCC-SLP Speech and Language Pathologist                         Time Calculation:    Time Calculation- SLP       Row Name 08/06/24 1609             Time Calculation- SLP    SLP Start Time 1522  -MB      SLP Stop Time 1609  -MB      SLP Time Calculation (min) 47 min  -MB      SLP Received On 08/06/24  -MB      SLP Goal  Re-Cert Due Date 08/16/24  -MB         Untimed Charges    32616-OP Eval Oral Pharyng Swallow Minutes 47  -MB         Total Minutes    Untimed Charges Total Minutes 47  -MB       Total Minutes 47  -MB                User Key  (r) = Recorded By, (t) = Taken By, (c) = Cosigned By      Initials Name Provider Type    Sofia Looney CCC-SLP Speech and Language Pathologist                    Therapy Charges for Today       Code Description Service Date Service Provider Modifiers Qty    57831015668  ST EVAL ORAL PHARYNG SWALLOW 3 8/6/2024 Sofia Pearce CCC-SLP GN 1                 DEWEY Johnson  8/6/2024

## 2024-08-06 NOTE — THERAPY TREATMENT NOTE
"Acute Care - Physical Therapy Treatment Note  [facility]     Patient Name: Rachel Ryderr  : 1945  MRN: [id number]  Today's Date: 2024      Visit Dx:     ICD-10-CM ICD-9-CM   1. Acute appendicitis with localized peritonitis, without perforation, abscess, or gangrene  K35.30 540.9   2. Acute appendicitis, unspecified acute appendicitis type  K35.80 540.9   3. Appendicitis, acute  K35.80 540.9   4. Impaired mobility [Z74.09]  Z74.09 799.89     Patient Active Problem List   Diagnosis    Acute appendicitis with localized peritonitis, without perforation, abscess, or gangrene    Acute appendicitis    Status post laparoscopic appendectomy     Past Medical History:   Diagnosis Date    Hypertension      Past Surgical History:   Procedure Laterality Date    APPENDECTOMY N/A 2024    Procedure: APPENDECTOMY LAPAROSCOPIC;  Surgeon: Alexey Cruz MD;  Location: [facility];  Service: General;  Laterality: N/A;    HYSTERECTOMY       PT Assessment (Last 12 Hours)       PT Evaluation and Treatment       Row Name 24 0909 24 0756       Physical Therapy Time and Intention    Subjective Information complains of;weakness;fatigue;dizziness  \"just don't feel right\"  - --  -    Document Type therapy note (daily note)  - --    Mode of Treatment physical therapy  - --    Session Not Performed -- patient/family declined, not feeling well  -    Comment, Session Not Performed -- pt requested to check back later  -      Row Name 24 0909          General Information    Patient Profile Reviewed yes  -     Existing Precautions/Restrictions fall  -       Row Name 24 0909          Pain    Pretreatment Pain Rating 0/10 - no pain  -     Posttreatment Pain Rating 0/10 - no pain  -       Row Name 24 0909          Cognition    Orientation Status (Cognition) oriented x 4  -       Row Name 24 0909          Bed Mobility    Bed Mobility supine-sit  -     Supine-Sit Gilman (Bed " Mobility) standby assist;verbal cues  -     Sit-Supine Gordon (Bed Mobility) contact guard;verbal cues  -     Assistive Device (Bed Mobility) head of bed elevated  -       Row Name 08/06/24 0909          Sit-Stand Transfer    Sit-Stand Gordon (Transfers) standby assist  -       Row Name 08/06/24 0909          Toilet Transfer    Gordon Level (Toilet Transfer) contact guard;verbal cues  -       Row Name 08/06/24 0909          Gait/Stairs (Locomotion)    Gordon Level (Gait) contact guard;verbal cues  -     Distance in Feet (Gait) 15   15 feet to bathroom, then 15 feet to oscar, c/o dizziness had to sit in chair  -     Deviations/Abnormal Patterns (Gait) gait speed decreased  -       Row Name 08/06/24 0909          Safety Issues, Functional Mobility    Impairments Affecting Function (Mobility) endurance/activity tolerance;balance  -       Row Name             Wound 08/02/24 2056 abdomen Incision    Wound - Properties Group Placement Date: 08/02/24  -KM Placement Time: 2056  -KM Present on Original Admission: N  -KM Location: abdomen  -KM Primary Wound Type: Incision  -KM Additional Comments: trocar site x3  -KM    Retired Wound - Properties Group Placement Date: 08/02/24  -KM Placement Time: 2056  -KM Present on Original Admission: N  -KM Location: abdomen  -KM Primary Wound Type: Incision  -KM Additional Comments: trocar site x3  -KM    Retired Wound - Properties Group Date first assessed: 08/02/24  -KM Time first assessed: 2056  -KM Present on Original Admission: N  -KM Location: abdomen  -KM Primary Wound Type: Incision  -KM Additional Comments: trocar site x3  -KM      Row Name 08/06/24 0909          Plan of Care Review    Plan of Care Reviewed With patient  -     Progress declining  -     Outcome Evaluation pt c/o not feeling well, trans to EOB sba, sit-stand cga, amb 15 feet to bathroom cga-min, toilet trans cga-sba, pt amb 15 feet to oscar HHA cga-min, c/o feeling  dizzy, had to sit in chair, nsg took /90, pt was wheeled back to room, trans to bed cga-min, /83, left in care of RN,  SHREYAS       Row Name 08/06/24 0909          Positioning and Restraints    Pre-Treatment Position in bed  -     Post Treatment Position bed  -     In Bed notified nsg;fowlers;call light within reach;encouraged to call for assist;with nsg;SCD pump applied  -               User Key  (r) = Recorded By, (t) = Taken By, (c) = Cosigned By      Initials Name Provider Type    Angie Finnegan, PTA Physical Therapist Assistant    Dania Ludwig RN Registered Nurse                    Physical Therapy Education       Title: PT OT SLP Therapies (Done)       Topic: Physical Therapy (Done)       Point: Mobility training (Done)       Learning Progress Summary             Patient Acceptance, E, VU by SB at 8/5/2024 1028    Comment: pt edu on progress, goals    Acceptance, D,E, VU,DU by  at 8/4/2024 1057    Comment: benefits of PT and POC, call for A OOB   Family Acceptance, E, VU by SB at 8/5/2024 1028    Comment: pt edu on progress, goals                         Point: Home exercise program (Done)       Learning Progress Summary             Patient Acceptance, E, VU by SB at 8/5/2024 1028    Comment: pt edu on progress, goals   Family Acceptance, E, VU by SB at 8/5/2024 1028    Comment: pt edu on progress, goals                         Point: Body mechanics (Done)       Learning Progress Summary             Patient Acceptance, E, VU by SB at 8/5/2024 1028    Comment: pt edu on progress, goals   Family Acceptance, E, VU by SB at 8/5/2024 1028    Comment: pt edu on progress, goals                         Point: Precautions (Done)       Learning Progress Summary             Patient Acceptance, E, VU by SB at 8/5/2024 1028    Comment: pt edu on progress, goals    Acceptance, D,E, VU,DU by  at 8/4/2024 1057    Comment: benefits of PT and POC, call for A OOB   Family Acceptance, E, VU by SB at  8/5/2024 1028    Comment: pt edu on progress, goals                                         User Key       Initials Effective Dates Name Provider Type Discipline     02/03/23 -  Hoang Tom, PT Physical Therapist PT    SB 07/11/23 -  Fely Ferguson, EFRAIN DPT Physical Therapist PT                  PT Recommendation and Plan  Anticipated Discharge Disposition (PT): home  Plan of Care Reviewed With: patient  Progress: declining  Outcome Evaluation: pt c/o not feeling well, trans to EOB sba, sit-stand cga, amb 15 feet to bathroom cga-min, toilet trans cga-sba, pt amb 15 feet to oscar HHA cga-min, c/o feeling dizzy, had to sit in chair, nsg took /90, pt was wheeled back to room, trans to bed cga-min, /83, left in care of RN,   Outcome Measures       Row Name 08/06/24 0900             How much help from another person do you currently need...    Turning from your back to your side while in flat bed without using bedrails? 4  -AH      Moving from lying on back to sitting on the side of a flat bed without bedrails? 4  -AH      Moving to and from a bed to a chair (including a wheelchair)? 4  -AH      Standing up from a chair using your arms (e.g., wheelchair, bedside chair)? 4  -AH      Climbing 3-5 steps with a railing? 3  -AH      To walk in hospital room? 3  -AH      AM-PAC 6 Clicks Score (PT) 22  -      Highest Level of Mobility Goal 7 --> Walk 25 feet or more  -         Functional Assessment    Outcome Measure Options AM-PAC 6 Clicks Basic Mobility (PT)  -                User Key  (r) = Recorded By, (t) = Taken By, (c) = Cosigned By      Initials Name Provider Type     Angie Qureshi, PTA Physical Therapist Assistant                     Time Calculation:    PT Charges       Row Name 08/06/24 0940             Time Calculation    Start Time 0909  -      Stop Time 0932  -      Time Calculation (min) 23 min  -      PT Received On 08/06/24  -         Time Calculation- PT    Total Timed Code  Minutes- PT 23 minute(s)  -AH         Timed Charges    04759 - Gait Training Minutes  13  -AH      04533 - PT Therapeutic Activity Minutes 10  -AH         Total Minutes    Timed Charges Total Minutes 23  -AH       Total Minutes 23  -AH                User Key  (r) = Recorded By, (t) = Taken By, (c) = Cosigned By      Initials Name Provider Type     Angie Qureshi PTA Physical Therapist Assistant                  Therapy Charges for Today       Code Description Service Date Service Provider Modifiers Qty    00133845426 HC GAIT TRAINING EA 15 MIN 8/6/2024 Angie Qureshi, LUCIANA GP 1    50706775480 HC PT THERAPEUTIC ACT EA 15 MIN 8/6/2024 Angie Qureshi, PTA GP 1            PT G-Codes  Outcome Measure Options: AM-PAC 6 Clicks Basic Mobility (PT)  AM-PAC 6 Clicks Score (PT): 22    Angie Qureshi PTA  8/6/2024

## 2024-08-06 NOTE — PLAN OF CARE
Goal Outcome Evaluation:  Plan of Care Reviewed With: patient        Progress: declining  Outcome Evaluation: pt c/o not feeling well, trans to EOB sba, sit-stand cga, amb 15 feet to bathroom cga-min, toilet trans cga-sba, pt amb 15 feet to oscar HHA cga-min, c/o feeling dizzy, had to sit in chair, nsg took /90, pt was wheeled back to room, trans to bed cga-min, /83, left in care of RN,      Anticipated Discharge Disposition (PT): home

## 2024-08-06 NOTE — PLAN OF CARE
Problem: Adult Inpatient Plan of Care  Goal: Plan of Care Review  Outcome: Ongoing, Progressing  Flowsheets (Taken 8/6/2024 2295)  Plan of Care Reviewed With: patient  Outcome Evaluation: Pt denies pain or nausea. IVF and IV abx. Drsgs CDI. Ambulating with stand by assist. Having multiple BMs. Safety maintained.

## 2024-08-06 NOTE — PLAN OF CARE
Goal Outcome Evaluation:  Plan of Care Reviewed With: patient, spouse           Outcome Evaluation: SEe note      Anticipated Discharge Disposition (SLP): unknown          SLP Swallowing Diagnosis: functional oral phase, R/O pharyngeal dysphagia (08/06/24 4893)

## 2024-08-06 NOTE — PROGRESS NOTES
"Patient Care Team:  Jena Shen MD as PCP - General (Family Medicine)    Alton Ramos Suiter is POD 4 from her laparoscopic appendectomy.  Postoperative ileus improved.  Patient has had bowel movements and less distention.  However this a.m. when ambulating with physical therapy the patient did feel lightheaded.  She was reassessed to have hypertensive blood pressures.  Once patient returned to the bed her lightheadedness improved.  She is only on clears currently and would like to have something more tolerable and of substance.         Review of Systems:     Review of Systems - General ROS: positive for  - fatigue  Respiratory ROS: no cough, shortness of breath, or wheezing  Cardiovascular ROS: no chest pain or dyspnea on exertion  Gastrointestinal ROS: positive for - gas/bloating    Objective     Vital Signs  /86   Pulse 67   Temp 97.7 °F (36.5 °C) (Oral)   Resp 16   Ht 172.7 cm (68\")   Wt 68.9 kg (151 lb 14.4 oz)   SpO2 90%   BMI 23.10 kg/m²     Physical Exam:  Physical Exam  Vitals reviewed.   Constitutional:       General: She is not in acute distress.     Appearance: Normal appearance. She is normal weight. She is not ill-appearing.   Cardiovascular:      Rate and Rhythm: Normal rate and regular rhythm.   Abdominal:      General: A surgical scar is present. Bowel sounds are normal. There is no distension.      Palpations: Abdomen is soft.      Tenderness: There is no abdominal tenderness. There is no guarding or rebound.          Comments: Wound dressings clean dry and intact.  Tenderness specifically at incision sites appropriately.       Neurological:      Mental Status: She is alert.           Results Review:    Labs reviewed  CBC          8/4/2024    05:57 8/5/2024    05:08 8/6/2024    04:35   CBC   WBC 14.08  13.20  12.33    RBC 3.87  3.66  3.52    Hemoglobin 11.1  10.7  10.1    Hematocrit 36.8  34.0  33.1    MCV 95.1  92.9  94.0    MCH 28.7  29.2  28.7    MCHC 30.2  31.5  30.5 "    RDW 12.9  12.9  13.0    Platelets 167  205  224             Medication Reviewed:   Current Facility-Administered Medications   Medication Dose Route Frequency Provider Last Rate Last Admin    acetaminophen (TYLENOL) tablet 650 mg  650 mg Oral Q6H Alexey Cruz MD   650 mg at 08/05/24 2011    ceFAZolin 2000 mg IVPB in 100 mL NS (MBP)  2,000 mg Intravenous Once Alexey Cruz MD        dexAMETHasone (DECADRON) injection 4 mg  4 mg Intravenous Q8H PRN Alexey Cruz MD   4 mg at 08/04/24 1529    diphenhydrAMINE (BENADRYL) injection 25 mg  25 mg Intravenous Q6H PRN Alexey Cruz MD        HYDROcodone-acetaminophen (NORCO) 5-325 MG per tablet 1 tablet  1 tablet Oral Q6H PRN Alexey Cruz MD        lactated ringers infusion  135 mL/hr Intravenous Continuous Alexey Cruz MD        lactated ringers infusion  75 mL/hr Intravenous Continuous Alexey Cruz MD 75 mL/hr at 08/06/24 0042 75 mL/hr at 08/06/24 0042    losartan (COZAAR) tablet 25 mg  25 mg Oral Daily Alexey Cruz MD   25 mg at 08/06/24 0858    metoclopramide (REGLAN) injection 5 mg  5 mg Intravenous Q8H Alexey Cruz MD   5 mg at 08/06/24 0509    metoprolol tartrate (LOPRESSOR) injection 5 mg  5 mg Intravenous Q6H PRN Stephanie Jarrett APRN   5 mg at 08/06/24 1024    metroNIDAZOLE (FLAGYL) IVPB 500 mg  500 mg Intravenous Once Alexey Cruz MD        metroNIDAZOLE (FLAGYL) IVPB 500 mg  500 mg Intravenous Q8H Alexey Cruz  mL/hr at 08/06/24 0858 500 mg at 08/06/24 0858    naloxone (NARCAN) injection 0.1 mg  0.1 mg Intravenous Q5 Min PRN Alexey Cruz MD        ondansetron (ZOFRAN) injection 4 mg  4 mg Intravenous Q6H PRN Alexey Cruz MD   4 mg at 08/04/24 1102    pantoprazole (PROTONIX) EC tablet 40 mg  40 mg Oral Q AM lAexey Cruz MD   40 mg at 08/03/24 0541    promethazine (PHENERGAN) 12.5 mg in sodium chloride 0.9 % 50 mL  12.5 mg Intravenous Q6H PRN Alexey Cruz MD   12.5 mg at 08/04/24 1316     scopolamine patch 1 mg/72 hr  1 patch Transdermal Once Alexey Cruz MD        simethicone (MYLICON) chewable tablet 40 mg  40 mg Oral 4x Daily PRN Alexey Cruz MD        sodium chloride 0.9 % flush 1-10 mL  1-10 mL Intravenous PRN Alexey Cruz MD        sodium chloride 0.9 % flush 10 mL  10 mL Intravenous Q12H Alexey Cruz MD   10 mL at 08/06/24 1002    sodium chloride 0.9 % flush 10 mL  10 mL Intravenous PRN Alexey Cruz MD        sodium chloride 0.9 % flush 10 mL  10 mL Intravenous Q12H Alexey Cruz MD   10 mL at 08/06/24 1002    sodium chloride 0.9 % infusion 40 mL  40 mL Intravenous PRN Alexey Cruz MD        sodium chloride 0.9 % infusion 40 mL  40 mL Intravenous PRN Alexey Cruz MD        traMADol (ULTRAM) tablet 50 mg  50 mg Oral Q6H PRN Alexey Cruz MD           Assessment & Plan  POD 4 from laparoscopic appendectomy.    Will advance diet as tolerated.  Will address blood pressure currently with as needed antihypertensive meds.  Reassess in a.m. overall status.  GI symptoms have improved with apparent resolution of postoperative ileus.      Alexey Cruz MD  08/06/24  10:26 CDT

## 2024-08-07 PROBLEM — K91.89 ILEUS, POSTOPERATIVE: Status: ACTIVE | Noted: 2024-08-07

## 2024-08-07 PROBLEM — K56.7 ILEUS, POSTOPERATIVE: Status: ACTIVE | Noted: 2024-08-07

## 2024-08-07 PROBLEM — I10 HYPERTENSION: Status: ACTIVE | Noted: 2024-08-07

## 2024-08-07 PROBLEM — E87.6 HYPOKALEMIA: Status: ACTIVE | Noted: 2024-08-07

## 2024-08-07 LAB
ANION GAP SERPL CALCULATED.3IONS-SCNC: 13 MMOL/L (ref 5–15)
BASOPHILS # BLD AUTO: 0.07 10*3/MM3 (ref 0–0.2)
BASOPHILS NFR BLD AUTO: 0.5 % (ref 0–1.5)
BUN SERPL-MCNC: 16 MG/DL (ref 8–23)
BUN/CREAT SERPL: 25 (ref 7–25)
CALCIUM SPEC-SCNC: 8.1 MG/DL (ref 8.6–10.5)
CHLORIDE SERPL-SCNC: 102 MMOL/L (ref 98–107)
CO2 SERPL-SCNC: 24 MMOL/L (ref 22–29)
CREAT SERPL-MCNC: 0.64 MG/DL (ref 0.57–1)
DEPRECATED RDW RBC AUTO: 43.9 FL (ref 37–54)
EGFRCR SERPLBLD CKD-EPI 2021: 91.2 ML/MIN/1.73
EOSINOPHIL # BLD AUTO: 0.03 10*3/MM3 (ref 0–0.4)
EOSINOPHIL NFR BLD AUTO: 0.2 % (ref 0.3–6.2)
ERYTHROCYTE [DISTWIDTH] IN BLOOD BY AUTOMATED COUNT: 13.2 % (ref 12.3–15.4)
GLUCOSE SERPL-MCNC: 125 MG/DL (ref 65–99)
HCT VFR BLD AUTO: 35.3 % (ref 34–46.6)
HGB BLD-MCNC: 11.2 G/DL (ref 12–15.9)
IMM GRANULOCYTES # BLD AUTO: 0.62 10*3/MM3 (ref 0–0.05)
IMM GRANULOCYTES NFR BLD AUTO: 4.7 % (ref 0–0.5)
LYMPHOCYTES # BLD AUTO: 2.68 10*3/MM3 (ref 0.7–3.1)
LYMPHOCYTES NFR BLD AUTO: 20.5 % (ref 19.6–45.3)
MAGNESIUM SERPL-MCNC: 1.8 MG/DL (ref 1.6–2.4)
MCH RBC QN AUTO: 29.1 PG (ref 26.6–33)
MCHC RBC AUTO-ENTMCNC: 31.7 G/DL (ref 31.5–35.7)
MCV RBC AUTO: 91.7 FL (ref 79–97)
MONOCYTES # BLD AUTO: 1.08 10*3/MM3 (ref 0.1–0.9)
MONOCYTES NFR BLD AUTO: 8.3 % (ref 5–12)
NEUTROPHILS NFR BLD AUTO: 65.8 % (ref 42.7–76)
NEUTROPHILS NFR BLD AUTO: 8.59 10*3/MM3 (ref 1.7–7)
NRBC BLD AUTO-RTO: 0.2 /100 WBC (ref 0–0.2)
PLATELET # BLD AUTO: 222 10*3/MM3 (ref 140–450)
PMV BLD AUTO: 10.9 FL (ref 6–12)
POTASSIUM SERPL-SCNC: 3 MMOL/L (ref 3.5–5.2)
RBC # BLD AUTO: 3.85 10*6/MM3 (ref 3.77–5.28)
SODIUM SERPL-SCNC: 139 MMOL/L (ref 136–145)
WBC NRBC COR # BLD AUTO: 13.07 10*3/MM3 (ref 3.4–10.8)

## 2024-08-07 PROCEDURE — 25010000002 POTASSIUM CHLORIDE PER 2 MEQ: Performed by: SURGERY

## 2024-08-07 PROCEDURE — 25010000002 HYDRALAZINE PER 20 MG: Performed by: SURGERY

## 2024-08-07 PROCEDURE — 25010000002 PROMETHAZINE PER 50 MG: Performed by: SURGERY

## 2024-08-07 PROCEDURE — 25010000002 METRONIDAZOLE 500 MG/100ML SOLUTION: Performed by: SURGERY

## 2024-08-07 PROCEDURE — 83735 ASSAY OF MAGNESIUM: CPT | Performed by: INTERNAL MEDICINE

## 2024-08-07 PROCEDURE — 85025 COMPLETE CBC W/AUTO DIFF WBC: CPT | Performed by: SURGERY

## 2024-08-07 PROCEDURE — 97116 GAIT TRAINING THERAPY: CPT

## 2024-08-07 PROCEDURE — 25010000002 ONDANSETRON PER 1 MG: Performed by: SURGERY

## 2024-08-07 PROCEDURE — 25010000002 METOCLOPRAMIDE PER 10 MG: Performed by: SURGERY

## 2024-08-07 PROCEDURE — 80048 BASIC METABOLIC PNL TOTAL CA: CPT | Performed by: SURGERY

## 2024-08-07 PROCEDURE — 25810000003 LACTATED RINGERS PER 1000 ML: Performed by: SURGERY

## 2024-08-07 RX ORDER — METOCLOPRAMIDE HYDROCHLORIDE 5 MG/ML
5 INJECTION INTRAMUSCULAR; INTRAVENOUS ONCE
Status: COMPLETED | OUTPATIENT
Start: 2024-08-07 | End: 2024-08-07

## 2024-08-07 RX ORDER — LOSARTAN POTASSIUM 50 MG/1
25 TABLET ORAL ONCE
Status: COMPLETED | OUTPATIENT
Start: 2024-08-07 | End: 2024-08-07

## 2024-08-07 RX ORDER — LOSARTAN POTASSIUM 50 MG/1
25 TABLET ORAL ONCE
Status: DISCONTINUED | OUTPATIENT
Start: 2024-08-07 | End: 2024-08-07

## 2024-08-07 RX ORDER — POTASSIUM CHLORIDE 29.8 MG/ML
20 INJECTION INTRAVENOUS ONCE
Status: COMPLETED | OUTPATIENT
Start: 2024-08-07 | End: 2024-08-07

## 2024-08-07 RX ORDER — LOSARTAN POTASSIUM 50 MG/1
50 TABLET ORAL DAILY
Status: DISCONTINUED | OUTPATIENT
Start: 2024-08-08 | End: 2024-08-08

## 2024-08-07 RX ORDER — POTASSIUM CHLORIDE 750 MG/1
40 CAPSULE, EXTENDED RELEASE ORAL EVERY 6 HOURS
Status: DISPENSED | OUTPATIENT
Start: 2024-08-07 | End: 2024-08-07

## 2024-08-07 RX ADMIN — Medication 10 ML: at 10:33

## 2024-08-07 RX ADMIN — POTASSIUM CHLORIDE 40 MEQ: 750 CAPSULE, EXTENDED RELEASE ORAL at 12:25

## 2024-08-07 RX ADMIN — METRONIDAZOLE 500 MG: 500 INJECTION, SOLUTION INTRAVENOUS at 00:34

## 2024-08-07 RX ADMIN — LOSARTAN POTASSIUM 25 MG: 50 TABLET, FILM COATED ORAL at 10:29

## 2024-08-07 RX ADMIN — POTASSIUM CHLORIDE 20 MEQ: 400 INJECTION, SOLUTION INTRAVENOUS at 12:25

## 2024-08-07 RX ADMIN — METOCLOPRAMIDE HYDROCHLORIDE 5 MG: 5 INJECTION INTRAMUSCULAR; INTRAVENOUS at 20:35

## 2024-08-07 RX ADMIN — ONDANSETRON 4 MG: 2 INJECTION INTRAMUSCULAR; INTRAVENOUS at 17:54

## 2024-08-07 RX ADMIN — POTASSIUM & SODIUM PHOSPHATES POWDER PACK 280-160-250 MG 1 PACKET: 280-160-250 PACK at 20:35

## 2024-08-07 RX ADMIN — SODIUM CHLORIDE, POTASSIUM CHLORIDE, SODIUM LACTATE AND CALCIUM CHLORIDE 75 ML/HR: 600; 310; 30; 20 INJECTION, SOLUTION INTRAVENOUS at 00:34

## 2024-08-07 RX ADMIN — METOCLOPRAMIDE HYDROCHLORIDE 5 MG: 5 INJECTION INTRAMUSCULAR; INTRAVENOUS at 04:45

## 2024-08-07 RX ADMIN — METOCLOPRAMIDE HYDROCHLORIDE 5 MG: 5 INJECTION INTRAMUSCULAR; INTRAVENOUS at 10:29

## 2024-08-07 RX ADMIN — HYDRALAZINE HYDROCHLORIDE 10 MG: 20 INJECTION INTRAMUSCULAR; INTRAVENOUS at 04:45

## 2024-08-07 RX ADMIN — ONDANSETRON 4 MG: 2 INJECTION INTRAMUSCULAR; INTRAVENOUS at 01:51

## 2024-08-07 RX ADMIN — PROMETHAZINE HYDROCHLORIDE 12.5 MG: 25 INJECTION INTRAMUSCULAR; INTRAVENOUS at 21:54

## 2024-08-07 RX ADMIN — ONDANSETRON 8 MG: 2 INJECTION INTRAMUSCULAR; INTRAVENOUS at 10:29

## 2024-08-07 RX ADMIN — LOSARTAN POTASSIUM 25 MG: 50 TABLET, FILM COATED ORAL at 12:25

## 2024-08-07 RX ADMIN — HYDRALAZINE HYDROCHLORIDE 10 MG: 20 INJECTION INTRAMUSCULAR; INTRAVENOUS at 20:35

## 2024-08-07 NOTE — THERAPY TREATMENT NOTE
Acute Care - Physical Therapy Treatment Note  [facility]     Patient Name: Rachel Ryderr  : 1945  MRN: [id number]  Today's Date: 2024      Visit Dx:     ICD-10-CM ICD-9-CM   1. Acute appendicitis with localized peritonitis, without perforation, abscess, or gangrene  K35.30 540.9   2. Acute appendicitis, unspecified acute appendicitis type  K35.80 540.9   3. Appendicitis, acute  K35.80 540.9   4. Impaired mobility [Z74.09]  Z74.09 799.89   5. Dysphagia, unspecified type  R13.10 787.20     Patient Active Problem List   Diagnosis    Acute appendicitis with localized peritonitis, without perforation, abscess, or gangrene    Acute appendicitis    Status post laparoscopic appendectomy     Past Medical History:   Diagnosis Date    Hypertension      Past Surgical History:   Procedure Laterality Date    APPENDECTOMY N/A 2024    Procedure: APPENDECTOMY LAPAROSCOPIC;  Surgeon: Alexey Cruz MD;  Location: [facility];  Service: General;  Laterality: N/A;    HYSTERECTOMY       PT Assessment (Last 12 Hours)       PT Evaluation and Treatment       Row Name 24 0907          Physical Therapy Time and Intention    Subjective Information complains of;weakness;fatigue;dizziness  -     Document Type therapy note (daily note)  -     Mode of Treatment physical therapy  -       Row Name 24 09          General Information    Patient Profile Reviewed yes  -     Existing Precautions/Restrictions fall  -       Row Name 24 09          Pain    Pretreatment Pain Rating 0/10 - no pain  -     Posttreatment Pain Rating 0/10 - no pain  -       Row Name 24 0907          Bed Mobility    Bed Mobility supine-sit  -     Supine-Sit [place] (Bed Mobility) standby assist;verbal cues  -     Sit-Supine [place] (Bed Mobility) modified independence  -     Assistive Device (Bed Mobility) head of bed elevated  -       Row Name 24 09          Sit-Stand Transfer    Sit-Stand  [place] (Transfers) standby assist  -       Row Name 08/07/24 0907          Toilet Transfer    [place] Level (Toilet Transfer) contact guard;verbal cues  -     Assistive Device (Toilet Transfer) commode  -       Row Name 08/07/24 0907          Gait/Stairs (Locomotion)    [place] Level (Gait) contact guard;verbal cues  -     Assistive Device (Gait) --  HHA  -     Distance in Feet (Gait) 15   15 x 2, pt amb to bathroom, c/o dizziness after using bathroom, amb back to bed  -     Deviations/Abnormal Patterns (Gait) gait speed decreased  -       Row Name 08/07/24 0907          Safety Issues, Functional Mobility    Impairments Affecting Function (Mobility) endurance/activity tolerance;balance  -       Row Name             Wound 08/02/24 2056 abdomen Incision    Wound - Properties Group Placement Date: 08/02/24  -KM Placement Time: 2056 -KM Present on Original Admission: N  -KM Location: abdomen  -KM Primary Wound Type: Incision  -KM Additional Comments: trocar site x3  -KM    Retired Wound - Properties Group Placement Date: 08/02/24  -KM Placement Time: 2056 -KM Present on Original Admission: N  -KM Location: abdomen  -KM Primary Wound Type: Incision  -KM Additional Comments: trocar site x3  -KM    Retired Wound - Properties Group Date first assessed: 08/02/24  -KM Time first assessed: 2056 -KM Present on Original Admission: N  -KM Location: abdomen  -KM Primary Wound Type: Incision  -KM Additional Comments: trocar site x3  -KM      Row Name 08/07/24 0907          Plan of Care Review    Plan of Care Reviewed With patient  -     Progress no change  -     Outcome Evaluation pt trans to EOB sba, sit-stand cga, pt amb 15 feet to bathroom cga HHA, toilet trans cga, pt c/o dizziness, amb 15 feet back to bed, /79, 02 sat on RA 91%, HR 83, RN aware  -       Row Name 08/07/24 0907          Positioning and Restraints    Pre-Treatment Position in bed  -     Post Treatment Position bed   -     In Bed fowlers;call light within reach;encouraged to call for assist;notified nsg  -AH               User Key  (r) = Recorded By, (t) = Taken By, (c) = Cosigned By      Initials Name Provider Type     Angie Qureshi, PTA Physical Therapist Assistant    Dania Ludwig, RN Registered Nurse                    Physical Therapy Education       Title: PT OT SLP Therapies (Done)       Topic: Physical Therapy (Done)       Point: Mobility training (Done)       Learning Progress Summary             Patient Acceptance, E, VU by SB at 8/5/2024 1028    Comment: pt edu on progress, goals    Acceptance, D,E, VU,DU by  at 8/4/2024 1057    Comment: benefits of PT and POC, call for A OOB   Family Acceptance, E, VU by SB at 8/5/2024 1028    Comment: pt edu on progress, goals                         Point: Home exercise program (Done)       Learning Progress Summary             Patient Acceptance, E, VU by SB at 8/5/2024 1028    Comment: pt edu on progress, goals   Family Acceptance, E, VU by  at 8/5/2024 1028    Comment: pt edu on progress, goals                         Point: Body mechanics (Done)       Learning Progress Summary             Patient Acceptance, E, VU by SB at 8/5/2024 1028    Comment: pt edu on progress, goals   Family Acceptance, E, VU by SB at 8/5/2024 1028    Comment: pt edu on progress, goals                         Point: Precautions (Done)       Learning Progress Summary             Patient Acceptance, E, VU by SB at 8/5/2024 1028    Comment: pt edu on progress, goals    Acceptance, D,E, VU,DU by  at 8/4/2024 1057    Comment: benefits of PT and POC, call for A OOB   Family Acceptance, E, VU by SB at 8/5/2024 1028    Comment: pt edu on progress, goals                                         User Key       Initials Effective Dates Name Provider Type Replaced by [facility] 02/03/23 -  Hoang Tom, PT Physical Therapist PT     07/11/23 -  Fely Ferguson, EFRAIN DPT Physical Therapist PT                   PT Recommendation and Plan  Anticipated Discharge Disposition (PT): home  Plan of Care Reviewed With: patient  Progress: no change  Outcome Evaluation: pt trans to EOB sba, sit-stand cga, pt amb 15 feet to bathroom cga HHA, toilet trans cga, pt c/o dizziness, amb 15 feet back to bed, /79, 02 sat on RA 91%, HR 83, RN aware   Outcome Measures       Row Name 08/07/24 0900 08/06/24 0900          How much help from another person do you currently need...    Turning from your back to your side while in flat bed without using bedrails? 4  -AH 4  -AH     Moving from lying on back to sitting on the side of a flat bed without bedrails? 4  -AH 4  -AH     Moving to and from a bed to a chair (including a wheelchair)? 3  -AH 4  -AH     Standing up from a chair using your arms (e.g., wheelchair, bedside chair)? 3  -AH 4  -AH     Climbing 3-5 steps with a railing? 2  -AH 3  -AH     To walk in hospital room? 3  -AH 3  -AH     AM-PAC 6 Clicks Score (PT) 19  -AH 22  -AH     Highest Level of Mobility Goal 6 --> Walk 10 steps or more  -AH 7 --> Walk 25 feet or more  -AH        Functional Assessment    Outcome Measure Options AM-PAC 6 Clicks Basic Mobility (PT)  - AM-PAC 6 Clicks Basic Mobility (PT)  -               User Key  (r) = Recorded By, (t) = Taken By, (c) = Cosigned By      Initials Name Provider Type     Angie Qureshi, PTA Physical Therapist Assistant                     Time Calculation:    PT Charges       Row Name 08/07/24 0935             Time Calculation    Start Time 0907  -      Stop Time 0930  -      Time Calculation (min) 23 min  -      PT Received On 08/07/24  -         Time Calculation- PT    Total Timed Code Minutes- PT 23 minute(s)  -         Timed Charges    36253 - Gait Training Minutes  23  -AH         Total Minutes    Timed Charges Total Minutes 23  -AH       Total Minutes 23  -AH                User Key  (r) = Recorded By, (t) = Taken By, (c) = Cosigned By      Initials Name  Provider Type     Angie Qureshi, LUCIANA Physical Therapist Assistant                  Therapy Charges for Today       Code Description Service Date Service Provider Modifiers Qty    54657556478 HC GAIT TRAINING EA 15 MIN 8/6/2024 Angie Qureshi, PTA GP 1    10850757417 HC PT THERAPEUTIC ACT EA 15 MIN 8/6/2024 Angie Qureshi, LUCIANA GP 1    42168288585 HC GAIT TRAINING EA 15 MIN 8/7/2024 Angie Qureshi, LUCIANA GP 2            PT G-Codes  Outcome Measure Options: AM-PAC 6 Clicks Basic Mobility (PT)  AM-PAC 6 Clicks Score (PT): 19    Angie Qureshi PTA  8/7/2024

## 2024-08-07 NOTE — CONSULTS
"      [facility] Medicine Consult  Consults    Date of Admission: 8/2/2024  Date of Consult: 08/07/24    Primary Care Physician: Jena Shen MD  Referring Physician: Dr. Cruz  Chief Complaint/Reason for Consultation: Assistance with BP management    Subjective   History of Present Illness  Patient is a 78-year-old  female with past medical history significant for hypertension that is currently postoperative day 5 from a laparoscopic appendectomy and hospitalist service has been consulted to assist with blood pressure management.  Patient's hospital course has been complicated by a postoperative ileus.  She is also had some electrolyte derangements including hypokalemia, but based upon my discussion with nursing there has been some difficulty with getting her to take the electrolyte supplements as ordered.  Patient states that she is starting to eat a little bit more.  She primarily had some fruit and grapes for breakfast.  She reports having a bowel movement earlier this morning.  The past couple of times that she is got out of bed to work with physical therapy she describes feeling lightheaded.  The symptoms have minimized her activity level particular over the past couple of days.  She takes 1 blood pressure pill at home, and reports that medication had to be titrated within the past year or so given a steady rise in her blood pressures.  Again, according to her med list it appears that she is on losartan-HCTZ.  She does appear somewhat frustrated stating \"I am normally a very active woman, always on the go, and this has really thrown me for a loop.\"    Review of Systems   Otherwise complete ROS is negative except as mentioned above.    Past Medical History:   Past Medical History:   Diagnosis Date    Hypertension      Past Surgical History:  Past Surgical History:   Procedure Laterality Date    APPENDECTOMY N/A 8/2/2024    Procedure: APPENDECTOMY LAPAROSCOPIC;  " Surgeon: Alexey Cruz MD;  Location: [facility];  Service: General;  Laterality: N/A;    HYSTERECTOMY       Social History:  reports that she has never smoked. She has never been exposed to tobacco smoke. She has never used smokeless tobacco. She reports that she does not drink alcohol and does not use drugs.    Family History: family history is not on file.     Allergies: No Known Allergies  Medications: Scheduled Meds:acetaminophen, 650 mg, Oral, Q6H  ceFAZolin, 2,000 mg, Intravenous, Once  losartan, 25 mg, Oral, Daily  metoclopramide, 5 mg, Intravenous, Q8H  metroNIDAZOLE, 500 mg, Intravenous, Once  pantoprazole, 40 mg, Oral, Q AM  potassium & sodium phosphates, 1 packet, Oral, 4x Daily With Meals & Nightly  Scopolamine, 1 patch, Transdermal, Once  sodium chloride, 10 mL, Intravenous, Q12H  sodium chloride, 10 mL, Intravenous, Q12H      Continuous Infusions:lactated ringers, 135 mL/hr  lactated ringers, 75 mL/hr, Last Rate: 75 mL/hr (08/07/24 0034)      PRN Meds:.  dexAMETHasone    diphenhydrAMINE    hydrALAZINE    HYDROcodone-acetaminophen    metoprolol tartrate    [DISCONTINUED] HYDROmorphone **AND** naloxone    ondansetron    promethazine    simethicone    sodium chloride    sodium chloride    sodium chloride    sodium chloride    traMADol    I have utilized all available immediate resources to obtain, update, or review the patient's current medications (including all prescriptions, over-the-counter products, herbals, cannabis/cannabidiol products, and vitamin/mineral/dietary (nutritional) supplements).     Objective   Objective    Physical Exam:   Temp:  [97.2 °F (36.2 °C)-98.3 °F (36.8 °C)] 98.3 °F (36.8 °C)  Heart Rate:  [65-81] 81  Resp:  [16-18] 16  BP: (142-184)/(64-80) 142/64  Physical Exam  Vitals reviewed.   HENT:      Head: Normocephalic.      Mouth/Throat:      Mouth: Mucous membranes are moist.   Cardiovascular:      Rate and Rhythm: Normal rate.   Pulmonary:      Effort: Pulmonary effort is  normal. No respiratory distress.   Abdominal:      Comments: Bowel sounds present but a bit hypoactive   Musculoskeletal:      Right lower leg: Edema present.      Left lower leg: Edema present.   Skin:     General: Skin is warm.   Neurological:      General: No focal deficit present.      Mental Status: She is alert.   Psychiatric:         Mood and Affect: Mood normal.         Results Reviewed:  I have personally reviewed current lab, radiology, and data and agree with results.  Lab Results (last 24 hours)       Procedure Component Value Units Date/Time    Basic Metabolic Panel [[id number]]  (Abnormal) Collected: 08/07/24 0912    Specimen: Blood Updated: 08/07/24 0945     Glucose 125 mg/dL      BUN 16 mg/dL      Creatinine 0.64 mg/dL      Sodium 139 mmol/L      Potassium 3.0 mmol/L      Chloride 102 mmol/L      CO2 24.0 mmol/L      Calcium 8.1 mg/dL      BUN/Creatinine Ratio 25.0     Anion Gap 13.0 mmol/L      eGFR 91.2 mL/min/1.73     Narrative:      GFR Normal >60  Chronic Kidney Disease <60  Kidney Failure <15    The GFR formula is only valid for adults with stable renal function between ages 18 and 70.    CBC & Differential [[id number]]  (Abnormal) Collected: 08/07/24 0912    Specimen: Blood Updated: 08/07/24 0920    Narrative:      The following orders were created for panel order CBC & Differential.  Procedure                               Abnormality         Status                     ---------                               -----------         ------                     CBC Auto Differential[[id number]]        Abnormal            Final result                 Please view results for these tests on the individual orders.    CBC Auto Differential [[id number]]  (Abnormal) Collected: 08/07/24 0912    Specimen: Blood Updated: 08/07/24 0920     WBC 13.07 10*3/mm3      RBC 3.85 10*6/mm3      Hemoglobin 11.2 g/dL      Hematocrit 35.3 %      MCV 91.7 fL      MCH 29.1 pg      MCHC 31.7 g/dL      RDW 13.2 %      RDW-SD 43.9  fl      MPV 10.9 fL      Platelets 222 10*3/mm3      Neutrophil % 65.8 %      Lymphocyte % 20.5 %      Monocyte % 8.3 %      Eosinophil % 0.2 %      Basophil % 0.5 %      Immature Grans % 4.7 %      Neutrophils, Absolute 8.59 10*3/mm3      Lymphocytes, Absolute 2.68 10*3/mm3      Monocytes, Absolute 1.08 10*3/mm3      Eosinophils, Absolute 0.03 10*3/mm3      Basophils, Absolute 0.07 10*3/mm3      Immature Grans, Absolute 0.62 10*3/mm3      nRBC 0.2 /100 WBC           Imaging Results (Last 24 Hours)       ** No results found for the last 24 hours. **            Assessment / Plan   Assessment:   Active Hospital Problems    Diagnosis     **Acute appendicitis     Hypertension     Ileus, postoperative     Hypokalemia     Acute appendicitis with localized peritonitis, without perforation, abscess, or gangrene     Status post laparoscopic appendectomy         Treatment Plan  For home medication lists losartan-HCTZ 100-25 mg tablets once daily.  She is currently on losartan 25 mg daily.  Most recent blood pressures as follows: 142/64, 148/71, 173/73, 156/75  Will give an additional 25 mg of losartan today, with plans to start losartan 50 mg tomorrow, but pending blood pressure trend could titrate further  Will hold starting HCTZ at this time given her persistent hypokalemia  Potassium replacement.  Will order K-Dur 40 mEq x 2 doses today and premedicate with anti-emetics. Potassium phosphate has been ordered however review of the MAR reveals that patient has been refusing this medication.  Add magnesium level  Will check orthostatic BPs  OOB to bedside chair this morning  Agree with Reglan and Zofran  Thank you for consult; will follow    Medical Decision Making  Number and Complexity of problems: Moderate complexity      Conditions and Status        Condition is unchanged.     [facility] Data  External documents reviewed: none  Cardiac tracing (EKG, telemetry) interpretation: no new EKGs  Radiology interpretation: no new  radiology studies  Labs reviewed: as above  Any tests that were considered but not ordered: none     Decision rules/scores evaluated (example [id number], [place], etc): none     Discussed with: patient, bedside nurse Svetlana     Care Planning  Shared decision making: Discussed with patient with agreement to proceed with treatment plan as outlined  Code status and discussions: Full code    Disposition  Social Determinants of Health that impact treatment or disposition: none apparent at this time  I expect the patient to be discharged by the primary service.     I confirmed that the patient's Advance Care Plan is present, code status is documented, or surrogate decision maker is listed in the patient's medical record.       Electronically signed by Irineo Milligan MD, 08/07/24, 10:44 CDT.

## 2024-08-07 NOTE — PLAN OF CARE
Goal Outcome Evaluation:           Progress: no change  Outcome Evaluation: Patient having no c/o pain. Up with assist. Patient up to chair . Voiding. Bm noted this shift. Patient had small amount of emesis noted with small nose bleed when attempting to take po potassium this shift.

## 2024-08-07 NOTE — PLAN OF CARE
Goal Outcome Evaluation:  Plan of Care Reviewed With: patient        Progress: no change  Outcome Evaluation: pt trans to EOB sba, sit-stand cga, pt amb 15 feet to bathroom cga HHA, toilet trans cga, pt c/o dizziness, amb 15 feet back to bed, /79, 02 sat on RA 91%, HR 83, RN aware      Anticipated Discharge Disposition (PT): home

## 2024-08-07 NOTE — PLAN OF CARE
Problem: Adult Inpatient Plan of Care  Goal: Plan of Care Review  Outcome: Ongoing, Progressing  Flowsheets (Taken 8/7/2024 0300)  Progress: no change  Plan of Care Reviewed With:   patient   spouse  Outcome Evaluation: Pt denies pain. Complained of nausea x1 after ambulating, Zofran given with good relief. PRN given for hypertension. IVF and IV abx. Voiding and having BMs. SCDs.  at bedside. Safety maintained.

## 2024-08-07 NOTE — PROGRESS NOTES
"Patient Care Team:  Jena Shen MD as PCP - General (Family Medicine)    Alton Ramos Suiter is POD 5  from her laparoscopic appendectomy.  Postoperative ileus improved.  Patient has had bowel movements.  Has loss of appetite poor p.o. intake reportedly by patient.  With nausea this a.m. noted to vomiting.     Review of Systems:     Review of Systems - General ROS: positive for  - fatigue  Respiratory ROS: no cough, shortness of breath, or wheezing  Cardiovascular ROS: no chest pain or dyspnea on exertion  Gastrointestinal ROS: positive for - gas/bloating    Objective     Vital Signs  /71 (BP Location: Left arm, Patient Position: Lying)   Pulse 70   Temp 98.3 °F (36.8 °C) (Oral)   Resp 16   Ht 172.7 cm (68\")   Wt 68.9 kg (151 lb 14.4 oz)   SpO2 93%   BMI 23.10 kg/m²     Physical Exam:  Physical Exam  Vitals reviewed.   Constitutional:       General: She is not in acute distress.     Appearance: Normal appearance. She is normal weight. She is not ill-appearing.   Cardiovascular:      Rate and Rhythm: Normal rate and regular rhythm.   Abdominal:      General: A surgical scar is present. Bowel sounds are normal. There is distension.      Palpations: Abdomen is soft.      Tenderness: There is no abdominal tenderness. There is no guarding or rebound.          Comments: Wound dressings clean removed and wounds are clean dry and intact below.     Neurological:      Mental Status: She is alert.           Results Review:    Labs reviewed  CBC          8/4/2024    05:57 8/5/2024    05:08 8/6/2024    04:35   CBC   WBC 14.08  13.20  12.33    RBC 3.87  3.66  3.52    Hemoglobin 11.1  10.7  10.1    Hematocrit 36.8  34.0  33.1    MCV 95.1  92.9  94.0    MCH 28.7  29.2  28.7    MCHC 30.2  31.5  30.5    RDW 12.9  12.9  13.0    Platelets 167  205  224             Medication Reviewed:   Current Facility-Administered Medications   Medication Dose Route Frequency Provider Last Rate Last Admin    acetaminophen " (TYLENOL) tablet 650 mg  650 mg Oral Q6H Alexey Cruz MD   650 mg at 08/05/24 2011    ceFAZolin 2000 mg IVPB in 100 mL NS (MBP)  2,000 mg Intravenous Once Alexey Cruz MD        dexAMETHasone (DECADRON) injection 4 mg  4 mg Intravenous Q8H PRN Alexey Cruz MD   4 mg at 08/04/24 1529    diphenhydrAMINE (BENADRYL) injection 25 mg  25 mg Intravenous Q6H PRN Alexey Cruz MD        hydrALAZINE (APRESOLINE) injection 10 mg  10 mg Intravenous Q4H PRN Alexey Cruz MD   10 mg at 08/07/24 0445    HYDROcodone-acetaminophen (NORCO) 5-325 MG per tablet 1 tablet  1 tablet Oral Q6H PRN Alexey Cruz MD        lactated ringers infusion  135 mL/hr Intravenous Continuous Alexey Cruz MD        lactated ringers infusion  75 mL/hr Intravenous Continuous Alexey Cruz MD 75 mL/hr at 08/07/24 0034 75 mL/hr at 08/07/24 0034    losartan (COZAAR) tablet 25 mg  25 mg Oral Daily Alexey Cruz MD   25 mg at 08/06/24 0858    metoclopramide (REGLAN) injection 5 mg  5 mg Intravenous Q8H Alexey Cruz MD   5 mg at 08/07/24 0445    metoprolol tartrate (LOPRESSOR) injection 5 mg  5 mg Intravenous Q6H PRN Stephanie Jarrett APRSULEMA   5 mg at 08/06/24 1024    metroNIDAZOLE (FLAGYL) IVPB 500 mg  500 mg Intravenous Once Alexey Cruz MD        naloxone (NARCAN) injection 0.1 mg  0.1 mg Intravenous Q5 Min PRN Alexey Cruz MD        ondansetron (ZOFRAN) injection 4 mg  4 mg Intravenous Q6H PRN Alexey Cruz MD   4 mg at 08/07/24 0151    pantoprazole (PROTONIX) EC tablet 40 mg  40 mg Oral Q AM Alexey Cruz MD   40 mg at 08/03/24 0541    potassium & sodium phosphates (PHOS-NAK) 280-160-250 MG packet 1 packet  1 packet Oral 4x Daily With Meals & Nightly Alexey Cruz MD   1 packet at 08/06/24 2001    promethazine (PHENERGAN) 12.5 mg in sodium chloride 0.9 % 50 mL  12.5 mg Intravenous Q6H PRN Alexey Cruz MD   12.5 mg at 08/04/24 1317    scopolamine patch 1 mg/72 hr  1 patch Transdermal Once  Alexey Cruz MD        simethicone (MYLICON) chewable tablet 40 mg  40 mg Oral 4x Daily PRN Alexey Cruz MD        sodium chloride 0.9 % flush 1-10 mL  1-10 mL Intravenous PRN Alexey Cruz MD        sodium chloride 0.9 % flush 10 mL  10 mL Intravenous Q12H Alexey Cruz MD   10 mL at 08/06/24 2003    sodium chloride 0.9 % flush 10 mL  10 mL Intravenous PRN Alexey Cruz MD        sodium chloride 0.9 % flush 10 mL  10 mL Intravenous Q12H Alexey Cruz MD   10 mL at 08/06/24 2003    sodium chloride 0.9 % infusion 40 mL  40 mL Intravenous PRN Alexey Cruz MD        sodium chloride 0.9 % infusion 40 mL  40 mL Intravenous PRN Alexey Cruz MD        traMADol (ULTRAM) tablet 50 mg  50 mg Oral Q6H PRN Alexey Cruz MD           Assessment & Plan  POD 5  from laparoscopic appendectomy.    Will advance diet as tolerated.  Poor appetite.  Will hold discharge until patient is tolerating p.o. intake.  Pending hospitalist consultation.    Alexey Cruz MD  08/07/24  08:56 CDT

## 2024-08-08 PROBLEM — K44.9 HIATAL HERNIA: Status: ACTIVE | Noted: 2024-08-08

## 2024-08-08 LAB
ANION GAP SERPL CALCULATED.3IONS-SCNC: 7 MMOL/L (ref 5–15)
BUN SERPL-MCNC: 15 MG/DL (ref 8–23)
BUN/CREAT SERPL: 23.8 (ref 7–25)
CALCIUM SPEC-SCNC: 8.1 MG/DL (ref 8.6–10.5)
CHLORIDE SERPL-SCNC: 103 MMOL/L (ref 98–107)
CO2 SERPL-SCNC: 29 MMOL/L (ref 22–29)
CREAT SERPL-MCNC: 0.63 MG/DL (ref 0.57–1)
EGFRCR SERPLBLD CKD-EPI 2021: 91.5 ML/MIN/1.73
GLUCOSE SERPL-MCNC: 112 MG/DL (ref 65–99)
PHOSPHATE SERPL-MCNC: 2.4 MG/DL (ref 2.5–4.5)
POTASSIUM SERPL-SCNC: 3.1 MMOL/L (ref 3.5–5.2)
POTASSIUM SERPL-SCNC: 3.6 MMOL/L (ref 3.5–5.2)
SODIUM SERPL-SCNC: 139 MMOL/L (ref 136–145)

## 2024-08-08 PROCEDURE — 25010000002 POTASSIUM CHLORIDE 10 MEQ/100ML SOLUTION: Performed by: INTERNAL MEDICINE

## 2024-08-08 PROCEDURE — 25010000002 ONDANSETRON PER 1 MG: Performed by: SURGERY

## 2024-08-08 PROCEDURE — 25010000002 METOCLOPRAMIDE PER 10 MG: Performed by: SURGERY

## 2024-08-08 PROCEDURE — 25010000002 PROMETHAZINE PER 50 MG: Performed by: SURGERY

## 2024-08-08 PROCEDURE — 25810000003 SODIUM CHLORIDE 0.9 % SOLUTION 250 ML FLEX CONT: Performed by: INTERNAL MEDICINE

## 2024-08-08 PROCEDURE — 97116 GAIT TRAINING THERAPY: CPT

## 2024-08-08 PROCEDURE — 25010000002 HYDRALAZINE PER 20 MG: Performed by: SURGERY

## 2024-08-08 PROCEDURE — 84132 ASSAY OF SERUM POTASSIUM: CPT | Performed by: INTERNAL MEDICINE

## 2024-08-08 PROCEDURE — 84100 ASSAY OF PHOSPHORUS: CPT | Performed by: INTERNAL MEDICINE

## 2024-08-08 PROCEDURE — 80048 BASIC METABOLIC PNL TOTAL CA: CPT | Performed by: SURGERY

## 2024-08-08 RX ORDER — LOSARTAN POTASSIUM 50 MG/1
100 TABLET ORAL DAILY
Status: DISCONTINUED | OUTPATIENT
Start: 2024-08-09 | End: 2024-08-09 | Stop reason: HOSPADM

## 2024-08-08 RX ORDER — POTASSIUM CHLORIDE 7.45 MG/ML
10 INJECTION INTRAVENOUS
Status: COMPLETED | OUTPATIENT
Start: 2024-08-08 | End: 2024-08-08

## 2024-08-08 RX ORDER — POTASSIUM CHLORIDE 29.8 MG/ML
20 INJECTION INTRAVENOUS ONCE
Status: DISCONTINUED | OUTPATIENT
Start: 2024-08-08 | End: 2024-08-08

## 2024-08-08 RX ADMIN — PANTOPRAZOLE SODIUM 40 MG: 40 TABLET, DELAYED RELEASE ORAL at 05:39

## 2024-08-08 RX ADMIN — ONDANSETRON 4 MG: 2 INJECTION INTRAMUSCULAR; INTRAVENOUS at 01:57

## 2024-08-08 RX ADMIN — POTASSIUM CHLORIDE 10 MEQ: 7.46 INJECTION, SOLUTION INTRAVENOUS at 05:39

## 2024-08-08 RX ADMIN — LOSARTAN POTASSIUM 50 MG: 50 TABLET, FILM COATED ORAL at 08:23

## 2024-08-08 RX ADMIN — PROMETHAZINE HYDROCHLORIDE 12.5 MG: 25 INJECTION INTRAMUSCULAR; INTRAVENOUS at 22:28

## 2024-08-08 RX ADMIN — POTASSIUM CHLORIDE 10 MEQ: 7.46 INJECTION, SOLUTION INTRAVENOUS at 14:51

## 2024-08-08 RX ADMIN — POTASSIUM CHLORIDE 10 MEQ: 7.46 INJECTION, SOLUTION INTRAVENOUS at 08:23

## 2024-08-08 RX ADMIN — POTASSIUM CHLORIDE 10 MEQ: 7.46 INJECTION, SOLUTION INTRAVENOUS at 12:53

## 2024-08-08 RX ADMIN — ACETAMINOPHEN 650 MG: 325 TABLET ORAL at 12:53

## 2024-08-08 RX ADMIN — Medication 10 ML: at 20:27

## 2024-08-08 RX ADMIN — HYDRALAZINE HYDROCHLORIDE 10 MG: 20 INJECTION INTRAMUSCULAR; INTRAVENOUS at 04:35

## 2024-08-08 RX ADMIN — ACETAMINOPHEN 650 MG: 325 TABLET ORAL at 20:26

## 2024-08-08 RX ADMIN — POTASSIUM CHLORIDE 10 MEQ: 7.46 INJECTION, SOLUTION INTRAVENOUS at 16:14

## 2024-08-08 RX ADMIN — ONDANSETRON 4 MG: 2 INJECTION INTRAMUSCULAR; INTRAVENOUS at 09:47

## 2024-08-08 RX ADMIN — POTASSIUM CHLORIDE 10 MEQ: 7.46 INJECTION, SOLUTION INTRAVENOUS at 10:26

## 2024-08-08 RX ADMIN — Medication 10 ML: at 08:24

## 2024-08-08 RX ADMIN — METOCLOPRAMIDE HYDROCHLORIDE 5 MG: 5 INJECTION INTRAMUSCULAR; INTRAVENOUS at 04:35

## 2024-08-08 RX ADMIN — METOCLOPRAMIDE HYDROCHLORIDE 5 MG: 5 INJECTION INTRAMUSCULAR; INTRAVENOUS at 11:59

## 2024-08-08 RX ADMIN — ONDANSETRON 4 MG: 2 INJECTION INTRAMUSCULAR; INTRAVENOUS at 16:14

## 2024-08-08 RX ADMIN — METOCLOPRAMIDE HYDROCHLORIDE 5 MG: 5 INJECTION INTRAMUSCULAR; INTRAVENOUS at 20:26

## 2024-08-08 RX ADMIN — POTASSIUM PHOSPHATE, MONOBASIC AND POTASSIUM PHOSPHATE, DIBASIC 15 MMOL: 224; 236 INJECTION, SOLUTION INTRAVENOUS at 17:48

## 2024-08-08 NOTE — PLAN OF CARE
Problem: Adult Inpatient Plan of Care  Goal: Plan of Care Review  Outcome: Ongoing, Progressing  Flowsheets (Taken 8/8/2024 0352)  Progress: no change  Plan of Care Reviewed With: patient  Outcome Evaluation: Pt denies pain. Up stand by assist. Complaining of nausea, see MAR. PRNs given for hypertension. Voiding. Safety maintained.  Replacing potassium per protocol

## 2024-08-08 NOTE — CASE MANAGEMENT/SOCIAL WORK
Continued Stay Note  MARIZOL Bowling     Patient Name: Rachel Ryderr  MRN: [id number]  Today's Date: 8/8/2024    Admit Date: 8/2/2024    Plan: Home   Discharge Plan       Row Name 08/08/24 7224       Plan    Plan Home    Patient/Family in Agreement with Plan yes    Plan Comments Chart reviewed. Patient did better today with therapy and still plans to return home. Will continue to follow.               HARIS Pierson

## 2024-08-08 NOTE — PLAN OF CARE
Goal Outcome Evaluation:  Plan of Care Reviewed With: patient        Progress: no change  Outcome Evaluation: LOS nutrition assessment. Pt is POD 6 for lap appendectomy for acute appendicitis. She is ordered a healthy heart diet. She reports a poor appetite. She has consumed 33% avg of the last 3 meals. She seems to be tolerating fruits and cold foods better than anything. She has had low K+, will send a banana with breakfast and dinner trays. She does not like the salt free seasoning and asks for it to be left off her tray. A dining ambassador has been seeing pt for meal choices daily. She couldn't find her menu, left another menu for pt to keep in her room. Advised her of alternate selections if needed. Will follow for further nutrition needs.

## 2024-08-08 NOTE — PROGRESS NOTES
"Patient Care Team:  Jena Shen MD as PCP - General (Family Medicine)    Alton Ramos Suiter is POD 6  from her laparoscopic appendectomy.  Postoperative ileus improved.  Patient this a.m. tolerating fruits, having diarrhea however.  Improvement of her nausea and she has more strength per her statement.  Feels less lightheaded.  Did not tolerate oral potassium and will receive it IV.       Review of Systems:     Review of Systems - General ROS: positive for  - fatigue improving  Respiratory ROS: no cough, shortness of breath, or wheezing  Cardiovascular ROS: no chest pain or dyspnea on exertion  Gastrointestinal ROS: positive for - gas/bloating decreased    Objective     Vital Signs  /82 (BP Location: Left arm, Patient Position: Lying)   Pulse 82   Temp 98.2 °F (36.8 °C) (Oral)   Resp 16   Ht 172.7 cm (68\")   Wt 68.9 kg (151 lb 14.4 oz)   SpO2 93%   BMI 23.10 kg/m²     Physical Exam:  Physical Exam  Vitals reviewed.   Constitutional:       General: She is not in acute distress.     Appearance: Normal appearance. She is normal weight. She is not ill-appearing.   Cardiovascular:      Rate and Rhythm: Normal rate and regular rhythm.   Abdominal:      General: A surgical scar is present. Bowel sounds are normal. There is distension.      Palpations: Abdomen is soft.      Tenderness: There is no abdominal tenderness. There is no guarding or rebound.          Comments: Wound dressings clean removed and wounds are clean dry and intact below.     Neurological:      Mental Status: She is alert.           Results Review:    Labs reviewed  CBC          8/5/2024    05:08 8/6/2024    04:35 8/7/2024    09:12   CBC   WBC 13.20  12.33  13.07    RBC 3.66  3.52  3.85    Hemoglobin 10.7  10.1  11.2    Hematocrit 34.0  33.1  35.3    MCV 92.9  94.0  91.7    MCH 29.2  28.7  29.1    MCHC 31.5  30.5  31.7    RDW 12.9  13.0  13.2    Platelets 205  224  222             Medication Reviewed:   Current " Facility-Administered Medications   Medication Dose Route Frequency Provider Last Rate Last Admin    acetaminophen (TYLENOL) tablet 650 mg  650 mg Oral Q6H Alexey Cruz MD   650 mg at 08/08/24 1253    Calcium Replacement - Follow Nurse / BPA Driven Protocol   Does not apply PRN Alexey Cruz MD        dexAMETHasone (DECADRON) injection 4 mg  4 mg Intravenous Q8H PRN Alexey Cruz MD   4 mg at 08/04/24 1529    diphenhydrAMINE (BENADRYL) injection 25 mg  25 mg Intravenous Q6H PRN Alexey Cruz MD        hydrALAZINE (APRESOLINE) injection 10 mg  10 mg Intravenous Q4H PRN Alexey Cruz MD   10 mg at 08/08/24 0435    HYDROcodone-acetaminophen (NORCO) 5-325 MG per tablet 1 tablet  1 tablet Oral Q6H PRN Alexey Cruz MD        [START ON 8/9/2024] losartan (COZAAR) tablet 100 mg  100 mg Oral Daily Irineo Milligan MD        Magnesium Standard Dose Replacement - Follow Nurse / BPA Driven Protocol   Does not apply PRN Alexey Cruz MD        metoclopramide (REGLAN) injection 5 mg  5 mg Intravenous Q8H Alexey Cruz MD   5 mg at 08/08/24 1159    metoprolol tartrate (LOPRESSOR) injection 5 mg  5 mg Intravenous Q6H PRN Setphanie Jarrett APRN   5 mg at 08/06/24 1024    metroNIDAZOLE (FLAGYL) IVPB 500 mg  500 mg Intravenous Once Alexey Cruz MD        naloxone (NARCAN) injection 0.1 mg  0.1 mg Intravenous Q5 Min PRN Alexey Cruz MD        ondansetron (ZOFRAN) injection 4 mg  4 mg Intravenous Q6H PRN Alexey Cruz MD   4 mg at 08/08/24 1614    pantoprazole (PROTONIX) EC tablet 40 mg  40 mg Oral Q AM Alexey Cruz MD   40 mg at 08/08/24 0539    Phosphorus Replacement - Follow Nurse / BPA Driven Protocol   Does not apply PRN Alexey Cruz MD        potassium phosphates 15 mmol in sodium chloride 0.9 % 250 mL infusion  15 mmol Intravenous Once Irineo Milligan MD   15 mmol at 08/08/24 1748    Potassium Replacement - Follow Nurse / BPA Driven Protocol   Does not apply PRN  Alexey Cruz MD        Potassium Replacement - Follow Nurse / BPA Driven Protocol   Does not apply PRN Alexey Cruz MD        promethazine (PHENERGAN) 12.5 mg in sodium chloride 0.9 % 50 mL  12.5 mg Intravenous Q6H PRN Alexey Cruz MD   12.5 mg at 08/07/24 2154    scopolamine patch 1 mg/72 hr  1 patch Transdermal Once Alexey Cruz MD        simethicone (MYLICON) chewable tablet 40 mg  40 mg Oral 4x Daily PRN Alexey Cruz MD        sodium chloride 0.9 % flush 1-10 mL  1-10 mL Intravenous PRN Alexey Cruz MD        sodium chloride 0.9 % flush 10 mL  10 mL Intravenous Q12H Alexey Cruz MD   10 mL at 08/08/24 0824    sodium chloride 0.9 % flush 10 mL  10 mL Intravenous PRN Alexey Cruz MD        sodium chloride 0.9 % flush 10 mL  10 mL Intravenous Q12H Alexey Cruz MD   10 mL at 08/08/24 0824    sodium chloride 0.9 % infusion 40 mL  40 mL Intravenous PRN Alexey Cruz MD        sodium chloride 0.9 % infusion 40 mL  40 mL Intravenous PRN Alexey Cruz MD        traMADol (ULTRAM) tablet 50 mg  50 mg Oral Q6H PRN Alexey Cruz MD           Assessment & Plan  POD 6  from laparoscopic appendectomy.    Will advance diet as tolerated.  Appetite is improving as well as activity.  Will hold discharge until patient is tolerating p.o. intake.  Hypokalemia and electrolytes monitored and will be corrected.  Possible discharge home soon.    Alexey Cruz MD  08/08/24  18:20 CDT

## 2024-08-08 NOTE — PROGRESS NOTES
[facility] Medicine Services  INPATIENT PROGRESS NOTE    Patient Name: Rachel Zaragoza  Date of Admission: 8/2/2024  Today's Date: 08/08/24  Length of Stay: 4  Primary Care Physician: Jena Shen MD    Subjective   Chief Complaint: follow-up HTN  HPI     Patient states that it did go better when she was out of bed with activity earlier.  She did not have any additional spells as she experienced a couple of days ago.  She continues to have some GI upset.  Her appetite has been poor.  She has been able to eat some strawberries, pineapple, and grapes.  Her  went to get her some Chick-david-A (Chicken minis) she reports that she ate very little.  It sounds like that she has been moving her bowels, and per her report from nurse they have been a bit loose.  I did discuss with her spouse to inquire if he can look at her home prescription bottle as I would like to confirm the precise antihypertensive medication she was taking at home.      Review of Systems   All pertinent negatives and positives are as above. All other systems have been reviewed and are negative unless otherwise stated.     Objective    Temp:  [97.5 °F (36.4 °C)-98.7 °F (37.1 °C)] 98.1 °F (36.7 °C)  Heart Rate:  [71-82] 80  Resp:  [16-18] 16  BP: (140-178)/(68-87) 156/82  Physical Exam  Vitals reviewed.   Constitutional:       General: She is not in acute distress.  HENT:      Head: Normocephalic.      Mouth/Throat:      Mouth: Mucous membranes are moist.   Pulmonary:      Effort: Pulmonary effort is normal. No respiratory distress.   Abdominal:      General: There is distension.      Comments: Hypoactive bowel sounds; abdomen a bit protuberant   Skin:     General: Skin is warm.   Neurological:      General: No focal deficit present.      Mental Status: She is alert.   Psychiatric:         Mood and Affect: Mood normal.         Results Review:  I have reviewed the labs, radiology results, and diagnostic  "studies.    Laboratory Data:   Results from last 7 days   Lab Units 08/07/24  0912 08/06/24  0435 08/05/24  0508   WBC 10*3/mm3 13.07* 12.33* 13.20*   HEMOGLOBIN g/dL 11.2* 10.1* 10.7*   HEMATOCRIT % 35.3 33.1* 34.0   PLATELETS 10*3/mm3 222 224 205        Results from last 7 days   Lab Units 08/08/24  0406 08/07/24  0912 08/06/24  0435 08/05/24  0508 08/04/24  0557   SODIUM mmol/L 139 139 142 141 139   POTASSIUM mmol/L 3.1* 3.0* 3.1* 3.4* 3.8   CHLORIDE mmol/L 103 102 106 103 102   CO2 mmol/L 29.0 24.0 27.0 26.0 28.0   BUN mg/dL 15 16 24* 25* 24*   CREATININE mg/dL 0.63 0.64 0.70 0.78 1.01*   CALCIUM mg/dL 8.1* 8.1* 8.1* 8.3* 8.6   BILIRUBIN mg/dL  --   --  0.2 0.2 0.4   ALK PHOS U/L  --   --  79 81 90   ALT (SGPT) U/L  --   --  6 6 8   AST (SGOT) U/L  --   --  14 13 16   GLUCOSE mg/dL 112* 125* 116* 159* 165*       Culture Data:   No results found for: \"BLOODCX\", \"URINECX\", \"WOUNDCX\", \"MRSACX\", \"RESPCX\", \"STOOLCX\"    Radiology Data:   Imaging Results (Last 24 Hours)       ** No results found for the last 24 hours. **            I have reviewed the patient's current medications.     Assessment/Plan   Assessment  Active Hospital Problems    Diagnosis     **Acute appendicitis     Hiatal hernia     Hypertension     Ileus, postoperative     Hypokalemia     Acute appendicitis with localized peritonitis, without perforation, abscess, or gangrene     Status post laparoscopic appendectomy        Treatment Plan  Titrate Losartan to 100mg today  Would still prefer to hold HCTZ given electrolyte derangements  Orthostatic BPs yesterday were stable  Phos was added to labs and returned low  Will add IV K Phos supplement  Continue IV K supplement; I anticipate oral will be more effective at correcting her hypokalemia but she continues to have GI upset, nausea, etc.  We discussed simple diet modifications - will try to get her some bananas  Magnesium level normal  OOB and continue to mobilize  Per report, patient has hiatal hernia; " continue PPI  Currently on scheduled Reglan  Continue to follow     Medical Decision Making  Number and Complexity of problems: Moderate complexity        Conditions and Status        Condition is unchanged.     [facility] Data  External documents reviewed: none  Cardiac tracing (EKG, telemetry) interpretation: no new EKGs  Radiology interpretation: no new radiology studies  Labs reviewed: as above  Any tests that were considered but not ordered: none     Decision rules/scores evaluated (example [id number], Wells, etc): none     Discussed with: patient, bedside nurse Maren, patient's spouse Levy     Care Planning  Shared decision making: Discussed with patient with agreement to proceed with treatment plan as outlined  Code status and discussions: Full code     Disposition  Social Determinants of Health that impact treatment or disposition: none apparent at this time  I expect the patient to be discharged by the primary service.     Electronically signed by Irineo Milligan MD, 08/08/24, 11:58 CDT.

## 2024-08-08 NOTE — THERAPY TREATMENT NOTE
Acute Care - Physical Therapy Treatment Note  [facility]     Patient Name: Rachel Ryderr  : 1945  MRN: [id number]  Today's Date: 2024      Visit Dx:     ICD-10-CM ICD-9-CM   1. Acute appendicitis with localized peritonitis, without perforation, abscess, or gangrene  K35.30 540.9   2. Acute appendicitis, unspecified acute appendicitis type  K35.80 540.9   3. Appendicitis, acute  K35.80 540.9   4. Impaired mobility [Z74.09]  Z74.09 799.89   5. Dysphagia, unspecified type  R13.10 787.20     Patient Active Problem List   Diagnosis    Acute appendicitis with localized peritonitis, without perforation, abscess, or gangrene    Acute appendicitis    Status post laparoscopic appendectomy    Hypertension    Ileus, postoperative    Hypokalemia    Hiatal hernia     Past Medical History:   Diagnosis Date    Hypertension      Past Surgical History:   Procedure Laterality Date    APPENDECTOMY N/A 2024    Procedure: APPENDECTOMY LAPAROSCOPIC;  Surgeon: Alexey Cruz MD;  Location: [facility];  Service: General;  Laterality: N/A;    HYSTERECTOMY       PT Assessment (Last 12 Hours)       PT Evaluation and Treatment       Row Name 24 1555 24 1027       Physical Therapy Time and Intention    Subjective Information no complaints  -WK complains of;weakness  -WK    Document Type therapy note (daily note)  -WK therapy note (daily note)  -WK    Mode of Treatment physical therapy  -WK physical therapy  -WK      Row Name 24 1555 24 1027       General Information    Existing Precautions/Restrictions fall  -WK fall  -WK      Row Name 24 1555          Pain    Pretreatment Pain Rating 0/10 - no pain  -WK     Posttreatment Pain Rating 0/10 - no pain  -WK       Row Name 24 1555 24 1027       Bed Mobility    Supine-Sit [place] (Bed Mobility) modified independence  -WK standby assist  -WK    Sit-Supine [place] (Bed Mobility) modified independence  -WK standby assist  -WK      Row  Name 08/08/24 1555 08/08/24 1027       Sit-Stand Transfer    Sit-Stand [place] (Transfers) standby assist  -WK standby assist  -WK      Row Name 08/08/24 1555          Toilet Transfer    [place] Level (Toilet Transfer) standby assist  -WK       Row Name 08/08/24 1555 08/08/24 1027       Gait/Stairs (Locomotion)    [place] Level (Gait) verbal cues;contact guard  -WK verbal cues;contact guard  -WK    Assistive Device (Gait) --  hha  -WK --    Distance in Feet (Gait) 100  -  -WK    Deviations/Abnormal Patterns (Gait) gait speed decreased  -WK gait speed decreased  -WK    Left Sided Gait Deviations -- forward flexed posture  -WK      Row Name             Wound 08/02/24 2056 abdomen Incision    Wound - Properties Group Placement Date: 08/02/24  -KM Placement Time: 2056 -KM Present on Original Admission: N  -KM Location: abdomen  -KM Primary Wound Type: Incision  -KM Additional Comments: trocar site x3  -KM    Retired Wound - Properties Group Placement Date: 08/02/24  -KM Placement Time: 2056 -KM Present on Original Admission: N  -KM Location: abdomen  -KM Primary Wound Type: Incision  -KM Additional Comments: trocar site x3  -KM    Retired Wound - Properties Group Date first assessed: 08/02/24  -KM Time first assessed: 2056 -KM Present on Original Admission: N  -KM Location: abdomen  -KM Primary Wound Type: Incision  -KM Additional Comments: trocar site x3  -KM      Row Name 08/08/24 1027          Plan of Care Review    Plan of Care Reviewed With patient  -WK     Progress improving  -WK     Outcome Evaluation Bed mobility SBA and sit to stand. Amb 100' CGA . Pt didn't amb further due to still not feeling well.  -WK       Row Name 08/08/24 1555 08/08/24 1027       Positioning and Restraints    Pre-Treatment Position in bed  -WK in bed  -WK    Post Treatment Position bed  -WK bed  -WK    In Bed fowlers;call light within reach;encouraged to call for assist;with family/caregiver;with nsg  -WK  osmin;call light within reach;encouraged to call for assist;with other staff  -WK              User Key  (r) = Recorded By, (t) = Taken By, (c) = Cosigned By      Initials Name Provider Type    WK Babita Murray, PTA Physical Therapist Assistant    Dania Ludwig, RN Registered Nurse                    Physical Therapy Education       Title: PT OT SLP Therapies (Done)       Topic: Physical Therapy (Done)       Point: Mobility training (Done)       Learning Progress Summary             Patient Acceptance, E, VU by SB at 8/5/2024 1028    Comment: pt edu on progress, goals    Acceptance, D,E, VU,DU by  at 8/4/2024 1057    Comment: benefits of PT and POC, call for A OOB   Family Acceptance, E, VU by  at 8/5/2024 1028    Comment: pt edu on progress, goals                         Point: Home exercise program (Done)       Learning Progress Summary             Patient Acceptance, E, VU by SB at 8/5/2024 1028    Comment: pt edu on progress, goals   Family Acceptance, E, VU by SB at 8/5/2024 1028    Comment: pt edu on progress, goals                         Point: Body mechanics (Done)       Learning Progress Summary             Patient Acceptance, E, VU by SB at 8/5/2024 1028    Comment: pt edu on progress, goals   Family Acceptance, E, VU by SB at 8/5/2024 1028    Comment: pt edu on progress, goals                         Point: Precautions (Done)       Learning Progress Summary             Patient Acceptance, E, VU by SB at 8/5/2024 1028    Comment: pt edu on progress, goals    Acceptance, D,E, VU,DU by  at 8/4/2024 1057    Comment: benefits of PT and POC, call for A OOB   Family Acceptance, E, VU by SB at 8/5/2024 1028    Comment: pt edu on progress, goals                                         User Key       Initials Effective Dates Name Provider Type [facility] 02/03/23 -  Hoang Tom, PT Physical Therapist PT    SB 07/11/23 -  Fely Ferguson PT DPT Physical Therapist PT                  PT  Recommendation and Plan     Plan of Care Reviewed With: patient  Progress: improving  Outcome Evaluation: Bed mobility SBA and sit to stand. Amb 100' CGA . Pt didn't amb further due to still not feeling well.   Outcome Measures       Row Name 08/08/24 1027 08/07/24 0900 08/06/24 0900       How much help from another person do you currently need...    Turning from your back to your side while in flat bed without using bedrails? 4  -WK 4  -AH 4  -AH    Moving from lying on back to sitting on the side of a flat bed without bedrails? 4  -WK 4  -AH 4  -AH    Moving to and from a bed to a chair (including a wheelchair)? 3  -WK 3  -AH 4  -AH    Standing up from a chair using your arms (e.g., wheelchair, bedside chair)? 4  -WK 3  -AH 4  -AH    Climbing 3-5 steps with a railing? 3  -WK 2  -AH 3  -AH    To walk in hospital room? 3  -WK 3  -AH 3  -AH    AM-PAC 6 Clicks Score (PT) 21  -WK 19  -AH 22  -AH    Highest Level of Mobility Goal 6 --> Walk 10 steps or more  -WK 6 --> Walk 10 steps or more  -AH 7 --> Walk 25 feet or more  -AH       Functional Assessment    Outcome Measure Options AM-PAC 6 Clicks Basic Mobility (PT)  -WK AM-PAC 6 Clicks Basic Mobility (PT)  -AH AM-PAC 6 Clicks Basic Mobility (PT)  -AH              User Key  (r) = Recorded By, (t) = Taken By, (c) = Cosigned By      Initials Name Provider Type     Angie Qureshi PTA Physical Therapist Assistant     Babita Murray PTA Physical Therapist Assistant                     Time Calculation:    PT Charges       Row Name 08/08/24 1609 08/08/24 1149          Time Calculation    Start Time 1555  -WK 1027  -WK     Stop Time 1609  -WK 1041  -WK     Time Calculation (min) 14 min  -WK 14 min  -WK     PT Received On 08/08/24  -WK 08/08/24  -WK        Time Calculation- PT    Total Timed Code Minutes- PT 14 minute(s)  -WK 14 minute(s)  -WK        Timed Charges    52925 - Gait Training Minutes  14  -WK 14  -WK        Total Minutes    Timed Charges Total Minutes 14   -WK 14  -WK      Total Minutes 14  -WK 14  -WK               User Key  (r) = Recorded By, (t) = Taken By, (c) = Cosigned By      Initials Name Provider Type    WK Babita Murray PTA Physical Therapist Assistant                  Therapy Charges for Today       Code Description Service Date Service Provider Modifiers Qty    04814749835 HC GAIT TRAINING EA 15 MIN 8/8/2024 Babita Murray PTA GP 1    22376431153 HC GAIT TRAINING EA 15 MIN 8/8/2024 Babita Murray PTA GP 1            PT G-Codes  Outcome Measure Options: AM-PAC 6 Clicks Basic Mobility (PT)  AM-PAC 6 Clicks Score (PT): 21    Babita Murray PTA  8/8/2024

## 2024-08-08 NOTE — PLAN OF CARE
Goal Outcome Evaluation:  Plan of Care Reviewed With: patient        Progress: improving  Outcome Evaluation: Bed mobility SBA and sit to stand. Amb 100' CGA . Pt didn't amb further due to still not feeling well.

## 2024-08-08 NOTE — PLAN OF CARE
Goal Outcome Evaluation:  Plan of Care Reviewed With: (P) patient, family        Progress: (P) improving  Outcome Evaluation: (P) A&O. Up with SBA. Room air. IID. Potassium and phos replaced per order. Scheduled and PRN meds for nausea given. No c/o pain. Voiding. BM. Lap sites are c/d/i with steri strips. Safety maintained.

## 2024-08-08 NOTE — THERAPY TREATMENT NOTE
Acute Care - Physical Therapy Treatment Note  [facility]     Patient Name: Rachel Ryderr  : 1945  MRN: [id number]  Today's Date: 2024      Visit Dx:     ICD-10-CM ICD-9-CM   1. Acute appendicitis with localized peritonitis, without perforation, abscess, or gangrene  K35.30 540.9   2. Acute appendicitis, unspecified acute appendicitis type  K35.80 540.9   3. Appendicitis, acute  K35.80 540.9   4. Impaired mobility [Z74.09]  Z74.09 799.89   5. Dysphagia, unspecified type  R13.10 787.20     Patient Active Problem List   Diagnosis    Acute appendicitis with localized peritonitis, without perforation, abscess, or gangrene    Acute appendicitis    Status post laparoscopic appendectomy    Hypertension    Ileus, postoperative    Hypokalemia     Past Medical History:   Diagnosis Date    Hypertension      Past Surgical History:   Procedure Laterality Date    APPENDECTOMY N/A 2024    Procedure: APPENDECTOMY LAPAROSCOPIC;  Surgeon: Alexey Cruz MD;  Location: [facility];  Service: General;  Laterality: N/A;    HYSTERECTOMY       PT Assessment (Last 12 Hours)       PT Evaluation and Treatment       Row Name 24 1027          Physical Therapy Time and Intention    Subjective Information complains of;weakness  -WK     Document Type therapy note (daily note)  -WK     Mode of Treatment physical therapy  -WK       Row Name 24 1027          General Information    Existing Precautions/Restrictions fall  -WK       Row Name 24 1027          Bed Mobility    Supine-Sit [place] (Bed Mobility) standby assist  -WK     Sit-Supine [place] (Bed Mobility) standby assist  -WK       Row Name 24 1027          Sit-Stand Transfer    Sit-Stand [place] (Transfers) standby assist  -WK       Row Name 24 1027          Gait/Stairs (Locomotion)    [place] Level (Gait) verbal cues;contact guard  -WK     Distance in Feet (Gait) 100  -WK     Deviations/Abnormal Patterns (Gait) gait speed  decreased  -WK     Left Sided Gait Deviations forward flexed posture  -WK       Row Name             Wound 08/02/24 2056 abdomen Incision    Wound - Properties Group Placement Date: 08/02/24 -KM Placement Time: 2056 -KM Present on Original Admission: N  -KM Location: abdomen  -KM Primary Wound Type: Incision  -KM Additional Comments: trocar site x3  -KM    Retired Wound - Properties Group Placement Date: 08/02/24  -KM Placement Time: 2056 -KM Present on Original Admission: N  -KM Location: abdomen  -KM Primary Wound Type: Incision  -KM Additional Comments: trocar site x3  -KM    Retired Wound - Properties Group Date first assessed: 08/02/24  -KM Time first assessed: 2056 -KM Present on Original Admission: N  -KM Location: abdomen  -KM Primary Wound Type: Incision  -KM Additional Comments: trocar site x3  -KM      Row Name 08/08/24 1027          Plan of Care Review    Plan of Care Reviewed With patient  -WK     Progress improving  -WK     Outcome Evaluation Bed mobility SBA and sit to stand. Amb 100' CGA . Pt didn't amb further due to still not feeling well.  -WK       Row Name 08/08/24 1027          Positioning and Restraints    Pre-Treatment Position in bed  -WK     Post Treatment Position bed  -WK     In Bed fowlers;call light within reach;encouraged to call for assist;with other staff  -WK               User Key  (r) = Recorded By, (t) = Taken By, (c) = Cosigned By      Initials Name Provider Type    WK Babita Murray PTA Physical Therapist Assistant     Dania Packer RN Registered Nurse                    Physical Therapy Education       Title: PT OT SLP Therapies (Done)       Topic: Physical Therapy (Done)       Point: Mobility training (Done)       Learning Progress Summary             Patient Acceptance, E, VU by SB at 8/5/2024 1028    Comment: pt edu on progress, goals    Acceptance, D,E, VU,DU by  at 8/4/2024 1057    Comment: benefits of PT and POC, call for A OOB   Family Acceptance, E, VU by  SB at 8/5/2024 1028    Comment: pt edu on progress, goals                         Point: Home exercise program (Done)       Learning Progress Summary             Patient Acceptance, E, VU by SB at 8/5/2024 1028    Comment: pt edu on progress, goals   Family Acceptance, E, VU by SB at 8/5/2024 1028    Comment: pt edu on progress, goals                         Point: Body mechanics (Done)       Learning Progress Summary             Patient Acceptance, E, VU by SB at 8/5/2024 1028    Comment: pt edu on progress, goals   Family Acceptance, E, VU by SB at 8/5/2024 1028    Comment: pt edu on progress, goals                         Point: Precautions (Done)       Learning Progress Summary             Patient Acceptance, E, VU by SB at 8/5/2024 1028    Comment: pt edu on progress, goals    Acceptance, D,E, VU,DU by  at 8/4/2024 1057    Comment: benefits of PT and POC, call for A OOB   Family Acceptance, E, VU by SB at 8/5/2024 1028    Comment: pt edu on progress, goals                                         User Key       Initials Effective Dates Name Provider Type Discipline     02/03/23 -  Hoang Tom, PT Physical Therapist PT    SB 07/11/23 -  Fely Ferguson, EFRAIN DPT Physical Therapist PT                  PT Recommendation and Plan     Plan of Care Reviewed With: patient  Progress: improving  Outcome Evaluation: Bed mobility SBA and sit to stand. Amb 100' CGA . Pt didn't amb further due to still not feeling well.   Outcome Measures       Row Name 08/08/24 1027 08/07/24 0900 08/06/24 0900       How much help from another person do you currently need...    Turning from your back to your side while in flat bed without using bedrails? 4  -WK 4  -AH 4  -AH    Moving from lying on back to sitting on the side of a flat bed without bedrails? 4  -WK 4  -AH 4  -AH    Moving to and from a bed to a chair (including a wheelchair)? 3  -WK 3  -AH 4  -AH    Standing up from a chair using your arms (e.g., wheelchair, bedside  chair)? 4  -WK 3  -AH 4  -AH    Climbing 3-5 steps with a railing? 3  -WK 2  -AH 3  -AH    To walk in hospital room? 3  -WK 3  -AH 3  -AH    AM-PAC 6 Clicks Score (PT) 21  -WK 19  -AH 22  -AH    Highest Level of Mobility Goal 6 --> Walk 10 steps or more  -WK 6 --> Walk 10 steps or more  -AH 7 --> Walk 25 feet or more  -AH       Functional Assessment    Outcome Measure Options AM-PAC 6 Clicks Basic Mobility (PT)  -WK AM-PAC 6 Clicks Basic Mobility (PT)  -AH AM-PAC 6 Clicks Basic Mobility (PT)  -              User Key  (r) = Recorded By, (t) = Taken By, (c) = Cosigned By      Initials Name Provider Type     Angie Qureshi PTA Physical Therapist Assistant     Babita Murray PTA Physical Therapist Assistant                     Time Calculation:    PT Charges       Row Name 08/08/24 1149             Time Calculation    Start Time 1027  -WK      Stop Time 1041  -WK      Time Calculation (min) 14 min  -WK      PT Received On 08/08/24  -WK         Time Calculation- PT    Total Timed Code Minutes- PT 14 minute(s)  -WK         Timed Charges    64410 - Gait Training Minutes  14  -WK         Total Minutes    Timed Charges Total Minutes 14  -WK       Total Minutes 14  -WK                User Key  (r) = Recorded By, (t) = Taken By, (c) = Cosigned By      Initials Name Provider Type     Babita Murray PTA Physical Therapist Assistant                  Therapy Charges for Today       Code Description Service Date Service Provider Modifiers Qty    25721649946 HC GAIT TRAINING EA 15 MIN 8/8/2024 Babita Murray PTA GP 1            PT G-Codes  Outcome Measure Options: AM-PAC 6 Clicks Basic Mobility (PT)  AM-PAC 6 Clicks Score (PT): 21    Babita Murray PTA  8/8/2024

## 2024-08-09 ENCOUNTER — READMISSION MANAGEMENT (OUTPATIENT)
Dept: CALL CENTER | Facility: HOSPITAL | Age: 79
End: 2024-08-09
Payer: MEDICARE

## 2024-08-09 VITALS
TEMPERATURE: 97.8 F | RESPIRATION RATE: 16 BRPM | OXYGEN SATURATION: 92 % | SYSTOLIC BLOOD PRESSURE: 164 MMHG | HEIGHT: 68 IN | DIASTOLIC BLOOD PRESSURE: 82 MMHG | HEART RATE: 85 BPM | BODY MASS INDEX: 23.02 KG/M2 | WEIGHT: 151.9 LBS

## 2024-08-09 LAB
ALBUMIN SERPL-MCNC: 2.7 G/DL (ref 3.5–5.2)
ALBUMIN/GLOB SERPL: 1 G/DL
ALP SERPL-CCNC: 74 U/L (ref 39–117)
ALT SERPL W P-5'-P-CCNC: 14 U/L (ref 1–33)
ANION GAP SERPL CALCULATED.3IONS-SCNC: 8 MMOL/L (ref 5–15)
AST SERPL-CCNC: 34 U/L (ref 1–32)
BILIRUB SERPL-MCNC: 0.4 MG/DL (ref 0–1.2)
BUN SERPL-MCNC: 16 MG/DL (ref 8–23)
BUN/CREAT SERPL: 22.5 (ref 7–25)
CALCIUM SPEC-SCNC: 7.9 MG/DL (ref 8.6–10.5)
CHLORIDE SERPL-SCNC: 103 MMOL/L (ref 98–107)
CO2 SERPL-SCNC: 28 MMOL/L (ref 22–29)
CREAT SERPL-MCNC: 0.71 MG/DL (ref 0.57–1)
DEPRECATED RDW RBC AUTO: 44.8 FL (ref 37–54)
EGFRCR SERPLBLD CKD-EPI 2021: 87.7 ML/MIN/1.73
ERYTHROCYTE [DISTWIDTH] IN BLOOD BY AUTOMATED COUNT: 13.2 % (ref 12.3–15.4)
GLOBULIN UR ELPH-MCNC: 2.8 GM/DL
GLUCOSE SERPL-MCNC: 106 MG/DL (ref 65–99)
HCT VFR BLD AUTO: 33.9 % (ref 34–46.6)
HGB BLD-MCNC: 10.5 G/DL (ref 12–15.9)
MAGNESIUM SERPL-MCNC: 1.7 MG/DL (ref 1.6–2.4)
MCH RBC QN AUTO: 28.7 PG (ref 26.6–33)
MCHC RBC AUTO-ENTMCNC: 31 G/DL (ref 31.5–35.7)
MCV RBC AUTO: 92.6 FL (ref 79–97)
PHOSPHATE SERPL-MCNC: 3 MG/DL (ref 2.5–4.5)
PLATELET # BLD AUTO: 204 10*3/MM3 (ref 140–450)
PMV BLD AUTO: 11.2 FL (ref 6–12)
POTASSIUM SERPL-SCNC: 3.8 MMOL/L (ref 3.5–5.2)
PROT SERPL-MCNC: 5.5 G/DL (ref 6–8.5)
RBC # BLD AUTO: 3.66 10*6/MM3 (ref 3.77–5.28)
SODIUM SERPL-SCNC: 139 MMOL/L (ref 136–145)
WBC NRBC COR # BLD AUTO: 12.14 10*3/MM3 (ref 3.4–10.8)

## 2024-08-09 PROCEDURE — 83735 ASSAY OF MAGNESIUM: CPT | Performed by: INTERNAL MEDICINE

## 2024-08-09 PROCEDURE — 85027 COMPLETE CBC AUTOMATED: CPT | Performed by: SURGERY

## 2024-08-09 PROCEDURE — 80053 COMPREHEN METABOLIC PANEL: CPT | Performed by: SURGERY

## 2024-08-09 PROCEDURE — 25010000002 METOCLOPRAMIDE PER 10 MG: Performed by: SURGERY

## 2024-08-09 PROCEDURE — 84100 ASSAY OF PHOSPHORUS: CPT | Performed by: INTERNAL MEDICINE

## 2024-08-09 RX ORDER — TRAMADOL HYDROCHLORIDE 50 MG/1
50 TABLET ORAL EVERY 8 HOURS PRN
Qty: 30 TABLET | Refills: 0 | Status: SHIPPED | OUTPATIENT
Start: 2024-08-09

## 2024-08-09 RX ORDER — SIMETHICONE 80 MG
40 TABLET,CHEWABLE ORAL EVERY 6 HOURS PRN
Qty: 30 TABLET | Refills: 1 | Status: SHIPPED | OUTPATIENT
Start: 2024-08-09

## 2024-08-09 RX ORDER — LOSARTAN POTASSIUM 100 MG/1
100 TABLET ORAL DAILY
Qty: 30 TABLET | Refills: 0 | Status: SHIPPED | OUTPATIENT
Start: 2024-08-09

## 2024-08-09 RX ORDER — ONDANSETRON 4 MG/1
4 TABLET, ORALLY DISINTEGRATING ORAL EVERY 8 HOURS PRN
Qty: 30 TABLET | Refills: 1 | Status: SHIPPED | OUTPATIENT
Start: 2024-08-09

## 2024-08-09 RX ADMIN — Medication 10 ML: at 08:47

## 2024-08-09 RX ADMIN — LOSARTAN POTASSIUM 100 MG: 50 TABLET, FILM COATED ORAL at 08:53

## 2024-08-09 RX ADMIN — ACETAMINOPHEN 650 MG: 325 TABLET ORAL at 06:35

## 2024-08-09 RX ADMIN — METOCLOPRAMIDE HYDROCHLORIDE 5 MG: 5 INJECTION INTRAMUSCULAR; INTRAVENOUS at 04:15

## 2024-08-09 RX ADMIN — PANTOPRAZOLE SODIUM 40 MG: 40 TABLET, DELAYED RELEASE ORAL at 06:36

## 2024-08-09 NOTE — PROGRESS NOTES
"Patient Care Team:  Jena Shen MD as PCP - General (Family Medicine)    Alton Ramos Suiter is POD 7  from her laparoscopic appendectomy.  Postoperative ileus improved.  Patient this a.m. tolerating fruits.  Electrolytes are stabilized.  Patient without complaints and states she is feeling better, ambulating and has much more strength.         Review of Systems:     Review of Systems - General ROS: positive for  - fatigue improving  Respiratory ROS: no cough, shortness of breath, or wheezing  Cardiovascular ROS: no chest pain or dyspnea on exertion  Gastrointestinal ROS: Negative    Objective     Vital Signs  /77 (BP Location: Left arm, Patient Position: Lying)   Pulse 78   Temp 99.1 °F (37.3 °C) (Oral)   Resp 16   Ht 172.7 cm (68\")   Wt 68.9 kg (151 lb 14.4 oz)   SpO2 91%   BMI 23.10 kg/m²     Physical Exam:  Physical Exam  Vitals reviewed.   Constitutional:       General: She is not in acute distress.     Appearance: Normal appearance. She is normal weight. She is not ill-appearing.   Cardiovascular:      Rate and Rhythm: Normal rate and regular rhythm.   Abdominal:      General: A surgical scar is present. Bowel sounds are normal. There is no distension.      Palpations: Abdomen is soft.      Tenderness: There is no abdominal tenderness. There is no guarding or rebound.          Comments: Wound dressings clean removed and wounds are clean dry and intact below.     Neurological:      Mental Status: She is alert.           Results Review:    Labs reviewed  CBC          8/6/2024    04:35 8/7/2024    09:12 8/9/2024    04:11   CBC   WBC 12.33  13.07  12.14    RBC 3.52  3.85  3.66    Hemoglobin 10.1  11.2  10.5    Hematocrit 33.1  35.3  33.9    MCV 94.0  91.7  92.6    MCH 28.7  29.1  28.7    MCHC 30.5  31.7  31.0    RDW 13.0  13.2  13.2    Platelets 224  222  204             Medication Reviewed:   Current Facility-Administered Medications   Medication Dose Route Frequency Provider Last Rate " Last Admin    acetaminophen (TYLENOL) tablet 650 mg  650 mg Oral Q6H Alexey Cruz MD   650 mg at 08/09/24 0635    Calcium Replacement - Follow Nurse / BPA Driven Protocol   Does not apply PRN Alexey Cruz MD        diphenhydrAMINE (BENADRYL) injection 25 mg  25 mg Intravenous Q6H PRN Alexey Crzu MD        hydrALAZINE (APRESOLINE) injection 10 mg  10 mg Intravenous Q4H PRN Alexey Cruz MD   10 mg at 08/08/24 0435    HYDROcodone-acetaminophen (NORCO) 5-325 MG per tablet 1 tablet  1 tablet Oral Q6H PRN Alexey Cruz MD        losartan (COZAAR) tablet 100 mg  100 mg Oral Daily Irineo Milligan MD        Magnesium Standard Dose Replacement - Follow Nurse / BPA Driven Protocol   Does not apply PRAlexey Estevez MD        metoclopramide (REGLAN) injection 5 mg  5 mg Intravenous Q8H Alexey Cruz MD   5 mg at 08/09/24 0415    metoprolol tartrate (LOPRESSOR) injection 5 mg  5 mg Intravenous Q6H PRN Stephanie Jarrett APRN   5 mg at 08/06/24 1024    metroNIDAZOLE (FLAGYL) IVPB 500 mg  500 mg Intravenous Once Alexey Cruz MD        naloxone (NARCAN) injection 0.1 mg  0.1 mg Intravenous Q5 Min PRN Alexey Cruz MD        ondansetron (ZOFRAN) injection 4 mg  4 mg Intravenous Q6H PRN Alexey Cruz MD   4 mg at 08/08/24 1614    pantoprazole (PROTONIX) EC tablet 40 mg  40 mg Oral Q AM Alexey Cruz MD   40 mg at 08/09/24 0636    Phosphorus Replacement - Follow Nurse / BPA Driven Protocol   Does not apply PRAlexey Estevez MD        promethazine (PHENERGAN) 12.5 mg in sodium chloride 0.9 % 50 mL  12.5 mg Intravenous Q6H PRN Alexey Cruz MD   12.5 mg at 08/08/24 2228    scopolamine patch 1 mg/72 hr  1 patch Transdermal Once Alexey Cruz MD        simethicone (MYLICON) chewable tablet 40 mg  40 mg Oral 4x Daily PRN Alexey Cruz MD        sodium chloride 0.9 % flush 1-10 mL  1-10 mL Intravenous PRN Alexey Cruz MD        sodium chloride 0.9 % flush 10 mL  10  mL Intravenous Q12H Alexey Cruz MD   10 mL at 08/08/24 2027    sodium chloride 0.9 % flush 10 mL  10 mL Intravenous PRN Alexey Cruz MD        traMADol (ULTRAM) tablet 50 mg  50 mg Oral Q6H PRN Alexey Cruz MD           Assessment & Plan  POD 7  from laparoscopic appendectomy.    Tolerating p.o.  Appetite is improving as well as activity.  Electrolyte abnormality improved.   Possible discharge home soon.    Alexey Cruz MD  08/09/24  08:23 CDT

## 2024-08-09 NOTE — PLAN OF CARE
Goal Outcome Evaluation:  Plan of Care Reviewed With: patient      Progress: improving     Pt A&O x4. Minimal c/o pain this shift. Up with stand-by assist. Voiding. Lap sites are clean, dry, and in tact. Minimal nausea episode x1; see MAR. VSS. Safety maintained.

## 2024-08-09 NOTE — PAYOR COMM NOTE
"REF:   [id number]     [facility]  FAX   [phone]     SuiterTrace (78 y.o. Female)       Date of Birth   1945    Social Security Number       Address   [address] [place] [address]    Home Phone   [phone]    MRN   [id number]       [place]   Other    Marital Status                               Admission Date   8/2/24    Admission Type   Emergency    Admitting Provider   Alexey Cruz MD    Attending Provider       Department, Room/Bed   [facility] 3C, 375/1       Discharge Date   8/9/2024    Discharge Disposition   Home or Self Care    Discharge Destination                                 Attending Provider: (none)   Allergies: No Known Allergies    Isolation: None   Infection: None   Code Status: Prior    Ht: 172.7 cm (68\")   Wt: 68.9 kg (151 lb 14.4 oz)    Admission Cmt: None   Principal Problem: Acute appendicitis [K35.80]                   Active Insurance as of 8/2/2024       Primary Coverage       Payor Plan Insurance Group Employer/Plan Group    [employer]HE[employer] MEDICARE REPLACEMENT [facility] MEDICARE ADVANTAGE [id number]       Payor Plan Address Payor Plan Phone Number Payor Plan Fax Number Effective Dates    [address] [phone]  1/1/2024 - None Entered    [facility] [address]         Subscriber Name Subscriber Birth Date Member ID       TRACE SUN 1945 [id number]                     Emergency Contacts        (Rel.) Home Phone Work Phone Mobile Phone    AMIRARRENETTA (Spouse) -- -- [phone]                  Discharge Order (From admission, onward)       Start     Ordered    08/09/24 0827  Discharge patient  Once        Comments: Avoid heavy lifting greater than 10 pounds for 4 weeks.  May shower but please do not soak wounds underwater.  Follow-up with primary care provider in 1 to 2 weeks with respect to modification of anti-hypertension medications.   Expected Discharge Date: 08/09/24   Discharge Disposition: Home or Self Care "   Physician of Record for Attribution - Please select from Treatment Team: KEVYN EAGLE [[id number]]   Review needed by CMO to determine Physician of Record: No      Question Answer Comment   Physician of Record for Attribution - Please select from Treatment Team KEVYN EAGLE    Review needed by CMO to determine Physician of Record No        08/09/24 0888

## 2024-08-09 NOTE — THERAPY DISCHARGE NOTE
Acute Care - Physical Therapy Discharge Summary   [facility]       Patient Name: Rachel Zaragoza  : 1945  MRN: [id number]    Today's Date: 2024                 Admit Date: 2024      PT Recommendation and Plan    Visit Dx:    ICD-10-CM ICD-9-CM   1. Status post laparoscopic appendectomy  Z90.49 V45.89   2. Acute appendicitis with localized peritonitis, without perforation, abscess, or gangrene  K35.30 540.9   3. Acute appendicitis, unspecified acute appendicitis type  K35.80 540.9   4. Appendicitis, acute  K35.80 540.9   5. Impaired mobility [Z74.09]  Z74.09 799.89   6. Dysphagia, unspecified type  R13.10 787.20        Outcome Measures       Row Name 24 1027 24 0900          How much help from another person do you currently need...    Turning from your back to your side while in flat bed without using bedrails? 4  -WK 4  -AH     Moving from lying on back to sitting on the side of a flat bed without bedrails? 4  -WK 4  -AH     Moving to and from a bed to a chair (including a wheelchair)? 3  -WK 3  -AH     Standing up from a chair using your arms (e.g., wheelchair, bedside chair)? 4  -WK 3  -AH     Climbing 3-5 steps with a railing? 3  -WK 2  -AH     To walk in hospital room? 3  -WK 3  -AH     AM-PAC 6 Clicks Score (PT) 21  -WK 19  -AH     Highest Level of Mobility Goal 6 --> Walk 10 steps or more  -WK 6 --> Walk 10 steps or more  -        Functional Assessment    Outcome Measure Options AM-PAC 6 Clicks Basic Mobility (PT)  -WK AM-PAC 6 Clicks Basic Mobility (PT)  -               User Key  (r) = Recorded By, (t) = Taken By, (c) = Cosigned By      Initials Name Provider Type     Angie Qureshi, PTA Physical Therapist Assistant    Babita Park PTA Physical Therapist Assistant                         PT Rehab Goals       Row Name 24 1610             Bed Mobility Goal 1 (PT)    Activity/Assistive Device (Bed Mobility Goal 1, PT) bed mobility activities, all  -KJ       Saline Level/Cues Needed (Bed Mobility Goal 1, PT) independent  -KJ      Time Frame (Bed Mobility Goal 1, PT) 10 days  -KJ      Progress/Outcomes (Bed Mobility Goal 1, PT) goal not met  -KJ         Transfer Goal 1 (PT)    Activity/Assistive Device (Transfer Goal 1, PT) sit-to-stand/stand-to-sit  -KJ      Saline Level/Cues Needed (Transfer Goal 1, PT) independent  -KJ      Time Frame (Transfer Goal 1, PT) 10 days  -KJ      Progress/Outcome (Transfer Goal 1, PT) goal not met  -KJ         Gait Training Goal 1 (PT)    Activity/Assistive Device (Gait Training Goal 1, PT) gait (walking locomotion)  -KJ      Saline Level (Gait Training Goal 1, PT) independent  -KJ      Distance (Gait Training Goal 1, PT) 525ft  -KJ      Time Frame (Gait Training Goal 1, PT) 10 days  -KJ      Progress/Outcome (Gait Training Goal 1, PT) goal not met  -KJ         Stairs Goal 1 (PT)    Activity/Assistive Device (Stairs Goal 1, PT) ascending stairs;descending stairs;using handrail, left;using handrail, right  -KJ      Saline Level/Cues Needed (Stairs Goal 1, PT) independent  -KJ      Number of Stairs (Stairs Goal 1, PT) 3  -KJ      Time Frame (Stairs Goal 1, PT) 10 days  -KJ      Progress/Outcome (Stairs Goal 1, PT) goal not met  -KJ                User Key  (r) = Recorded By, (t) = Taken By, (c) = Cosigned By      Initials Name Provider Type Discipline    Terri Trevino PTA Physical Therapist Assistant PT                        PT Discharge Summary  Anticipated Discharge Disposition (PT): home  Reason for Discharge: Discharge from facility  Outcomes Achieved: Refer to plan of care for updates on goals achieved  Discharge Destination: Home      Terri Daly PTA   8/9/2024

## 2024-08-10 NOTE — OUTREACH NOTE
Prep Survey      Flowsheet Row Responses   [facility] facility patient discharged from? [facility]   Is LACE score < 7 ? No   Eligibility Readm Mgmt   Discharge diagnosis Right lower quadrant painaparoscopic appendectomy   Does the patient have one of the following disease processes/diagnoses(primary or secondary)? General Surgery   Does the patient have Home health ordered? No   Is there a DME ordered? No   Prep survey completed? Yes            THOMAS JO - Registered Nurse

## 2024-08-10 NOTE — THERAPY DISCHARGE NOTE
Acute Care - Speech Language Pathology Discharge Summary   [place]       Patient Name: Rachel Zaragoza  : 1945  MRN: [id number]    Today's Date: 8/10/2024                   Admit Date: 2024      SLP Recommendation and Plan  Regular solids and thin liquids    Visit Dx:    ICD-10-CM ICD-9-CM   1. Status post laparoscopic appendectomy  Z90.49 V45.89   2. Acute appendicitis with localized peritonitis, without perforation, abscess, or gangrene  K35.30 540.9   3. Acute appendicitis, unspecified acute appendicitis type  K35.80 540.9   4. Appendicitis, acute  K35.80 540.9   5. Impaired mobility [Z74.09]  Z74.09 799.89   6. Dysphagia, unspecified type  R13.10 787.20                SLP GOALS       Row Name 08/10/24 1400             (LTG) Swallow    (LTG) Swallow Pt will tolerate least restrictive diet with no overt s/s of aspiration.  -MB      Tiff (Swallow Long Term Goal) independently (over 90% accuracy)  -MB      Time Frame (Swallow Long Term Goal) 1 week  -MB      Barriers (Swallow Long Term Goal) n/a  -MB      Progress/Outcomes (Swallow Long Term Goal) goal not met  -MB      Comment (Swallow Long Term Goal) n/a  -MB         (STG) Patient will tolerate trials of    Consistencies Trialed (Tolerate trials) regular textures;thin liquids  -MB      Desired Outcome (Tolerate trials) without signs/symptoms of aspiration;with adequate oral prep/transit/clearance  -MB      Tiff (Tolerate trials) independently (over 90% accuracy)  -MB      Time Frame (Tolerate trials) 1 week  -MB      Progress/Outcomes (Tolerate trials) goal not met  -MB      Comment (Tolerate trials) n/a  -MB                User Key  (r) = Recorded By, (t) = Taken By, (c) = Cosigned By      Initials Name Provider Type    Sofai Looney CCC-SLP Speech and Language Pathologist                    [email or web address]@      SLP Discharge Summary  Anticipated Discharge Disposition (SLP): unknown  Reason for Discharge: discharge from this  facility  Progress Toward Achieving Short/long Term Goals: goals not met within established timelines  Discharge Destination: home      Sofia Pearce CCC-SLP  8/10/2024

## 2024-08-11 LAB
CYTO UR: NORMAL
LAB AP CASE REPORT: NORMAL
Lab: NORMAL
PATH REPORT.FINAL DX SPEC: NORMAL
PATH REPORT.GROSS SPEC: NORMAL

## 2024-08-13 ENCOUNTER — OFFICE VISIT (OUTPATIENT)
Dept: BARIATRICS/WEIGHT MGMT | Facility: CLINIC | Age: 79
End: 2024-08-13
Payer: MEDICARE

## 2024-08-13 ENCOUNTER — READMISSION MANAGEMENT (OUTPATIENT)
Dept: CALL CENTER | Facility: HOSPITAL | Age: 79
End: 2024-08-13
Payer: MEDICARE

## 2024-08-13 VITALS
SYSTOLIC BLOOD PRESSURE: 177 MMHG | DIASTOLIC BLOOD PRESSURE: 114 MMHG | TEMPERATURE: 96 F | WEIGHT: 159 LBS | BODY MASS INDEX: 24.1 KG/M2 | OXYGEN SATURATION: 96 % | HEART RATE: 85 BPM | HEIGHT: 68 IN

## 2024-08-13 DIAGNOSIS — I10 HYPERTENSION, UNSPECIFIED TYPE: ICD-10-CM

## 2024-08-13 DIAGNOSIS — Z90.49 STATUS POST LAPAROSCOPIC APPENDECTOMY: Primary | ICD-10-CM

## 2024-08-13 PROCEDURE — 1160F RVW MEDS BY RX/DR IN RCRD: CPT | Performed by: SURGERY

## 2024-08-13 PROCEDURE — 1159F MED LIST DOCD IN RCRD: CPT | Performed by: SURGERY

## 2024-08-13 PROCEDURE — 3080F DIAST BP >= 90 MM HG: CPT | Performed by: SURGERY

## 2024-08-13 PROCEDURE — 3077F SYST BP >= 140 MM HG: CPT | Performed by: SURGERY

## 2024-08-13 PROCEDURE — 99024 POSTOP FOLLOW-UP VISIT: CPT | Performed by: SURGERY

## 2024-08-13 NOTE — OUTREACH NOTE
General Surgery Week 1 Survey      Flowsheet Row Responses   [facility] facility patient discharged from? [place]   Does the patient have one of the following disease processes/diagnoses(primary or secondary)? General Surgery   Week 1 attempt successful? No   Unsuccessful attempts Attempt 1            Cathryn Gandhi Registered Nurse

## 2024-08-13 NOTE — PROGRESS NOTES
"  Patient Care Team:  Jena Shen MD as PCP - General (Family Medicine)    Subjective     Rachel Suiter is a 78 y.o. female.     Rachel is post op 2+ weeks from her lap appendectomy.  She is currently on her regular diet.  She states she has been having bowel movements however not consistently.  She is tolerated her meals without difficulty.  Pain is adequately controlled.    Review Of Systems:  General ROS: negative  Respiratory ROS: no cough, shortness of breath, or wheezing  Cardiovascular ROS: no chest pain or dyspnea on exertion  Gastrointestinal ROS: no abdominal pain, change in bowel habits, or black or bloody stools    The following portions of the patient's history were reviewed and updated as appropriate: allergies, current medications, past family history, past medical history, past social history, past surgical history, and problem list.    Objective   BP (!) 177/114 (BP Location: Right arm, Patient Position: Sitting, Cuff Size: Adult)   Pulse 85   Temp 96 °F (35.6 °C)   Ht 172.7 cm (68\")   Wt 72.1 kg (159 lb)   SpO2 96%   BMI 24.18 kg/m²       08/13/24  0923   Weight: 72.1 kg (159 lb)        General Appearance:  awake, alert, oriented, in no acute distress  Lungs:  Normal expansion.  Clear to auscultation.  No rales, rhonchi, or wheezing.  Heart:  Heart regular rate and rhythm  Abdomen:  Soft, non-tender, normal bowel sounds; no bruits, organomegaly or masses.  Abnormal shape: normal  Wounds: clean, dry, intact    ASSESSMENT/ PLAN    Encounter Diagnoses   Name Primary?    Status post laparoscopic appendectomy Yes    Hypertension, unspecified type      Patient had elevated blood pressures.  She had this postoperatively as well.  She will be visiting with her primary care provider for adjustments of her blood pressure medication.  I explained to the patient to discuss her hypokalemia/low potassium levels which could be attributed to medications with her primary care provider.  Please I continued " our discussion of her hiatal hernia which she would like to have treated conservatively at this point.  The patient and I discussed their weight restrictions which is defined as avoid lifting greater than 15 lbs for at least 4 weeks from her surgery to allow healing of her tissue.  I also discussed the avoidance of driving or operating a motor vehicle if she requires, needs taking pain medication, or has a distracting injury or pain because of the risk of injuring herself or others.    08/13/24  09:44 CDT  Patient Care Team:  Jena Shen MD as PCP - General (Family Medicine)  Alexey Cruz MD FACS

## 2024-08-19 ENCOUNTER — READMISSION MANAGEMENT (OUTPATIENT)
Dept: CALL CENTER | Facility: HOSPITAL | Age: 79
End: 2024-08-19
Payer: MEDICARE

## 2024-08-19 NOTE — OUTREACH NOTE
General Surgery Week 2 Survey      Flowsheet Row Responses   [facility] patient discharged from? [place]   Does the patient have one of the following disease processes/diagnoses(primary or secondary)? General Surgery   Week 2 attempt successful? Yes   Call start time 1347   Call end time 1349   Discharge diagnosis Right lower quadrant painaparoscopic appendectomy   Person spoke with today (if not patient) and relationship patient   Meds reviewed with patient/caregiver? Yes   Does the patient have all medications related to this admission filled (includes all antibiotics, pain medications, etc.) Yes   Prescription comments PCP had to adjust BP medications.   Is the patient taking all medications as directed (includes completed medication regime)? Yes   Does the patient have a follow up appointment scheduled with their surgeon? Yes   Comments Patient has seen surgeon and pcp yesterday   Has home health visited the patient within 72 hours of discharge? N/A   Psychosocial issues? No   Did the patient receive a copy of their discharge instructions? Yes   Nursing interventions Reviewed instructions with patient   What is the patient's perception of their health status since discharge? Improving   Nursing interventions Nurse provided patient education   Is the patient/caregiver able to teach back signs and symptoms of incisional infection? Increased redness, swelling or pain at the incisonal site, Increased drainage or bleeding, Incisional warmth, Pus or odor from incision, Fever   Is the patient/caregiver able to teach back steps to recovery at home? Rest and rebuild strength, gradually increase activity, Set small, achievable goals for return to baseline health   Is the patient/caregiver able to teach back the hierarchy of who to call/visit for symptoms/problems? PCP, Specialist, Home health nurse, Urgent Care, ED, 911 Yes   Week 2 call completed? Yes   Graduated Yes   Wrap up additional comments Pt currently doing  well. No concerns or questions noted.   Call end time 0508            Terri LUKE - Registered Nurse

## 2024-10-30 NOTE — DISCHARGE SUMMARY
"  Date of Discharge:  8/9/2024    Discharge Diagnosis: Acute appendicitis [K35.80]    Presenting Problem/History of Present Illness  Acute appendicitis [K35.80]       Hospital Course  Patient is a 78 y.o. female presented with acute appendicitis.  She underwent a laparoscopic appendectomy.  Findings intraoperatively showed a small loculated area of possible abscess.  Postoperatively the patient remained stable however she developed a postoperative ileus.  She required NG tube placement due to multiple bouts of nausea vomiting.  NG tube was removed once patient progressed with flatus and bowel movements.  Her nausea is still with intermittent and controlled with antiemetics.  She also had developed hypertension, hypokalemia and electrolyte abnormalities which subsequently corrected to time with the assistance of the consulting hospitalist.  Her pain consistently improved and her appetite improved approximately postoperative day 5.  She remained in the hospital till postoperative day 7 tolerating p.o.  She was discharged home on postoperative day 7 tolerating as above soft, stable and instructed to avoid heavy lifting greater than 10 to 15 pounds for 4 weeks.  She is to follow-up with Dr. Cruz services in 5 to 7 days or as needed.            Procedures Performed  Procedure(s):  APPENDECTOMY LAPAROSCOPIC       Consults:   Consults       Date and Time Order Name Status Description    8/7/2024  8:59 AM Inpatient Hospitalist Consult                Condition on Discharge:  Stable    Vital Signs  /77 (BP Location: Left arm, Patient Position: Lying)   Pulse 78   Temp 99.1 °F (37.3 °C) (Oral)   Resp 16   Ht 172.7 cm (68\")   Wt 68.9 kg (151 lb 14.4 oz)   SpO2 91%   BMI 23.10 kg/m²     Physical Exam:  General Appearance: alert, appears stated age, and cooperative  Lungs: clear to auscultation, respirations regular, respirations even, and respirations unlabored  Heart: regular rhythm & normal rate, normal S1, S2, " Patient: Jian Greco    Procedure: Procedure(s):  Eye Exam Under Anesthesia  Anterior Vitrectomy and Subluxed Lens Removal       Anesthesia Type:  General    Note:  Disposition: Outpatient   Postop Pain Control: Uneventful            Sign Out: Well controlled pain   PONV:    Neuro/Psych: Uneventful            Sign Out: Acceptable/Baseline neuro status   Airway/Respiratory: Uneventful            Sign Out: Acceptable/Baseline resp. status   CV/Hemodynamics: Uneventful            Sign Out: Acceptable CV status; No obvious hypovolemia; No obvious fluid overload   Other NRE: NONE   DID A NON-ROUTINE EVENT OCCUR? No           Last vitals:  Vitals Value Taken Time   BP 97/81 10/29/24 1430   Temp 36.5  C (97.7  F) 10/29/24 1430   Pulse 119 10/29/24 1402   Resp 20 10/29/24 1430   SpO2 97 % 10/29/24 1430   Vitals shown include unfiled device data.    Electronically Signed By: Adam Pierce MD  October 29, 2024  7:51 PM   no murmur, no gallop, no rub, and no click  Abdomen: normal bowel sounds, no masses, no hepatomegaly, no splenomegaly, soft non-tender, no guarding, and no rebound tenderness  Extremities: moves extremities well, no edema, no cyanosis, and no redness    Discharge Disposition  Home or Self Care    Discharge Medications     Discharge Medications        New Medications        Instructions Start Date   ondansetron ODT 4 MG disintegrating tablet  Commonly known as: ZOFRAN-ODT   4 mg, Translingual, Every 8 Hours PRN      simethicone 80 MG chewable tablet  Commonly known as: MYLICON   40 mg, Oral, Every 6 Hours PRN      traMADol 50 MG tablet  Commonly known as: Ultram   50 mg, Oral, Every 8 Hours PRN             Changes to Medications        Instructions Start Date   losartan 100 MG tablet  Commonly known as: COZAAR  What changed:   medication strength  how much to take   100 mg, Oral, Daily             Continue These Medications        Instructions Start Date   omeprazole 40 MG capsule  Commonly known as: priLOSEC   40 mg, Oral, Daily             Stop These Medications      losartan-hydrochlorothiazide 100-25 MG per tablet  Commonly known as: HYZAAR              Discharge Diet: Continue regular diet as tolerated.    Activity at Discharge: No heavy lifting greater than 10 pounds for at least 4 weeks.    Follow-up Appointments  Future Appointments   Date Time Provider Department Center   8/13/2024  9:30 AM Stephanie Jarrett, [facility] None         Test Results Pending at Discharge  Pending Labs       Order Current Status    Tissue Pathology Exam In process             Alexey Cruz MD  08/09/24  08:30 CDT

## (undated) DEVICE — TOTAL TRAY, 16FR 10ML SIL FOLEY, URN: Brand: MEDLINE

## (undated) DEVICE — HARMONIC 700 SHEARS, ADVANCED HEMOSTASIS: Brand: HARMONIC

## (undated) DEVICE — GAUZE,SPONGE,2"X2",8PLY,STERILE,LF,2'S: Brand: MEDLINE

## (undated) DEVICE — GLV SURG SENSICARE W/ALOE PF LF 8 STRL

## (undated) DEVICE — STRIP,CLOSURE,WOUND,MEDI-STRIP,1/2X4: Brand: MEDLINE

## (undated) DEVICE — ENDOPATH XCEL BLADELESS TROCARS WITH STABILITY SLEEVES: Brand: ENDOPATH XCEL

## (undated) DEVICE — SUT VIC 0 UR6 27IN VCP603H

## (undated) DEVICE — DRSNG SURESITE WNDW 2.38X2.75

## (undated) DEVICE — ENDOPATH XCEL WITH OPTIVIEW TECHNOLOGY UNIVERSAL TROCAR STABILITY SLEEVES: Brand: ENDOPATH XCEL OPTIVIEW

## (undated) DEVICE — PAD LAP CHOLE: Brand: MEDLINE INDUSTRIES, INC.

## (undated) DEVICE — ENDOPATH XCEL WITH OPTIVIEW TECHNOLOGY BLADELESS TROCARS WITH STABILITY SLEEVES: Brand: ENDOPATH XCEL OPTIVIEW

## (undated) DEVICE — 2, DISPOSABLE SUCTION/IRRIGATOR WITHOUT DISPOSABLE TIP: Brand: STRYKEFLOW

## (undated) DEVICE — SUT MNCRYL 4/0 RB1 27IN UD MCP214H

## (undated) DEVICE — ECHELON 3000 45 STANDARD: Brand: ECHELON

## (undated) DEVICE — APPL CHLORAPREP HI/LITE 26ML ORNG

## (undated) DEVICE — MONOPOLAR METZENBAUM SCISSOR, MINI BLADE TIP, DISPOSABLE: Brand: MONOPOLAR METZENBAUM SCISSOR, MINI BLADE TIP, DISPOSABLE

## (undated) DEVICE — ADHS LIQ MASTISOL 2/3ML